# Patient Record
Sex: FEMALE | Race: WHITE | Employment: FULL TIME | URBAN - METROPOLITAN AREA
[De-identification: names, ages, dates, MRNs, and addresses within clinical notes are randomized per-mention and may not be internally consistent; named-entity substitution may affect disease eponyms.]

---

## 2020-05-27 ENCOUNTER — HOSPITAL ENCOUNTER (OUTPATIENT)
Dept: ULTRASOUND IMAGING | Age: 35
Discharge: HOME | End: 2020-05-27
Attending: NURSE PRACTITIONER
Payer: COMMERCIAL

## 2020-05-27 DIAGNOSIS — R22.2 LOCALIZED SWELLING, MASS AND LUMP, TRUNK: ICD-10-CM

## 2020-05-27 PROCEDURE — 76604 US EXAM CHEST: CPT

## 2021-05-03 ENCOUNTER — HOSPITAL ENCOUNTER (OUTPATIENT)
Dept: GENERAL RADIOLOGY | Age: 36
Discharge: HOME | End: 2021-05-03
Attending: NURSE PRACTITIONER
Payer: COMMERCIAL

## 2021-05-03 ENCOUNTER — TRANSCRIBE ORDER (OUTPATIENT)
Dept: GENERAL RADIOLOGY | Age: 36
End: 2021-05-03

## 2021-05-03 DIAGNOSIS — R06.00 DYSPNEA, UNSPECIFIED: ICD-10-CM

## 2021-05-03 DIAGNOSIS — R06.00 DYSPNEA, UNSPECIFIED: Primary | ICD-10-CM

## 2021-05-03 PROCEDURE — 71046 X-RAY EXAM CHEST 2 VIEWS: CPT

## 2024-06-14 LAB
ABO, EXTERNAL RESULT: NORMAL
C. TRACHOMATIS, EXTERNAL RESULT: NEGATIVE
HEP B, EXTERNAL RESULT: NEGATIVE
HIV, EXTERNAL RESULT: NON REACTIVE
N. GONORRHOEAE, EXTERNAL RESULT: NEGATIVE
RUBELLA TITER, EXTERNAL RESULT: NORMAL
T. PALLIDUM (SYPHILIS) ANTIBODY, EXTERNAL RESULT: NON REACTIVE

## 2024-11-15 LAB — GBS, EXTERNAL RESULT: NEGATIVE

## 2024-12-08 ENCOUNTER — HOSPITAL ENCOUNTER (INPATIENT)
Facility: HOSPITAL | Age: 39
LOS: 3 days | Discharge: HOME | DRG: 998 | End: 2024-12-11
Attending: OBSTETRICS & GYNECOLOGY | Admitting: OBSTETRICS & GYNECOLOGY
Payer: COMMERCIAL

## 2024-12-08 PROBLEM — Z3A.37 37 WEEKS GESTATION OF PREGNANCY: Status: ACTIVE | Noted: 2024-12-08

## 2024-12-08 PROBLEM — O10.919 CHRONIC HYPERTENSION AFFECTING PREGNANCY: Status: ACTIVE | Noted: 2024-12-08

## 2024-12-08 LAB
ABO + RH BLD: NORMAL
ALBUMIN SERPL-MCNC: 2.5 G/DL (ref 3.5–5)
ALBUMIN/GLOB SERPL: 0.8 (ref 1.1–2.2)
ALP SERPL-CCNC: 122 U/L (ref 45–117)
ALT SERPL-CCNC: 16 U/L (ref 12–78)
ANION GAP SERPL CALC-SCNC: 6 MMOL/L (ref 2–12)
AST SERPL-CCNC: 17 U/L (ref 15–37)
BILIRUB SERPL-MCNC: 0.3 MG/DL (ref 0.2–1)
BLOOD GROUP ANTIBODIES SERPL: NORMAL
BUN SERPL-MCNC: 8 MG/DL (ref 6–20)
BUN/CREAT SERPL: 17 (ref 12–20)
CALCIUM SERPL-MCNC: 8.2 MG/DL (ref 8.5–10.1)
CHLORIDE SERPL-SCNC: 107 MMOL/L (ref 97–108)
CO2 SERPL-SCNC: 23 MMOL/L (ref 21–32)
CREAT SERPL-MCNC: 0.47 MG/DL (ref 0.55–1.02)
CREAT UR-MCNC: 277 MG/DL
ERYTHROCYTE [DISTWIDTH] IN BLOOD BY AUTOMATED COUNT: 13.2 % (ref 11.5–14.5)
GLOBULIN SER CALC-MCNC: 3.3 G/DL (ref 2–4)
GLUCOSE SERPL-MCNC: 88 MG/DL (ref 65–100)
HCT VFR BLD AUTO: 30 % (ref 35–47)
HGB BLD-MCNC: 10.2 G/DL (ref 11.5–16)
MCH RBC QN AUTO: 30.9 PG (ref 26–34)
MCHC RBC AUTO-ENTMCNC: 34 G/DL (ref 30–36.5)
MCV RBC AUTO: 90.9 FL (ref 80–99)
NRBC # BLD: 0 K/UL (ref 0–0.01)
NRBC BLD-RTO: 0 PER 100 WBC
PLATELET # BLD AUTO: 231 K/UL (ref 150–400)
PMV BLD AUTO: 10.3 FL (ref 8.9–12.9)
POTASSIUM SERPL-SCNC: 4.3 MMOL/L (ref 3.5–5.1)
PROT SERPL-MCNC: 5.8 G/DL (ref 6.4–8.2)
PROT UR-MCNC: 22 MG/DL (ref 0–11.9)
PROT/CREAT UR-RTO: 0.1
RBC # BLD AUTO: 3.3 M/UL (ref 3.8–5.2)
SODIUM SERPL-SCNC: 136 MMOL/L (ref 136–145)
SPECIMEN EXP DATE BLD: NORMAL
WBC # BLD AUTO: 9.3 K/UL (ref 3.6–11)

## 2024-12-08 PROCEDURE — 86592 SYPHILIS TEST NON-TREP QUAL: CPT

## 2024-12-08 PROCEDURE — 84156 ASSAY OF PROTEIN URINE: CPT

## 2024-12-08 PROCEDURE — 82570 ASSAY OF URINE CREATININE: CPT

## 2024-12-08 PROCEDURE — 85027 COMPLETE CBC AUTOMATED: CPT

## 2024-12-08 PROCEDURE — 86850 RBC ANTIBODY SCREEN: CPT

## 2024-12-08 PROCEDURE — 86901 BLOOD TYPING SEROLOGIC RH(D): CPT

## 2024-12-08 PROCEDURE — 80053 COMPREHEN METABOLIC PANEL: CPT

## 2024-12-08 PROCEDURE — 86900 BLOOD TYPING SEROLOGIC ABO: CPT

## 2024-12-08 PROCEDURE — 6370000000 HC RX 637 (ALT 250 FOR IP): Performed by: ADVANCED PRACTICE MIDWIFE

## 2024-12-08 PROCEDURE — 36415 COLL VENOUS BLD VENIPUNCTURE: CPT

## 2024-12-08 PROCEDURE — 1100000000 HC RM PRIVATE

## 2024-12-08 RX ORDER — LABETALOL 100 MG/1
100 TABLET, FILM COATED ORAL 2 TIMES DAILY
COMMUNITY

## 2024-12-08 RX ORDER — ONDANSETRON 4 MG/1
4 TABLET, ORALLY DISINTEGRATING ORAL EVERY 6 HOURS PRN
Status: DISCONTINUED | OUTPATIENT
Start: 2024-12-08 | End: 2024-12-09 | Stop reason: SDUPTHER

## 2024-12-08 RX ORDER — MISOPROSTOL 200 UG/1
400 TABLET ORAL PRN
Status: DISCONTINUED | OUTPATIENT
Start: 2024-12-08 | End: 2024-12-09 | Stop reason: SDUPTHER

## 2024-12-08 RX ORDER — DOCUSATE SODIUM 100 MG/1
100 CAPSULE, LIQUID FILLED ORAL 2 TIMES DAILY
Status: DISCONTINUED | OUTPATIENT
Start: 2024-12-08 | End: 2024-12-11 | Stop reason: HOSPADM

## 2024-12-08 RX ORDER — SODIUM CHLORIDE 0.9 % (FLUSH) 0.9 %
5-40 SYRINGE (ML) INJECTION PRN
Status: DISCONTINUED | OUTPATIENT
Start: 2024-12-08 | End: 2024-12-11 | Stop reason: HOSPADM

## 2024-12-08 RX ORDER — ONDANSETRON 2 MG/ML
4 INJECTION INTRAMUSCULAR; INTRAVENOUS EVERY 6 HOURS PRN
Status: DISCONTINUED | OUTPATIENT
Start: 2024-12-08 | End: 2024-12-11 | Stop reason: HOSPADM

## 2024-12-08 RX ORDER — SODIUM CHLORIDE, SODIUM LACTATE, POTASSIUM CHLORIDE, AND CALCIUM CHLORIDE .6; .31; .03; .02 G/100ML; G/100ML; G/100ML; G/100ML
500 INJECTION, SOLUTION INTRAVENOUS PRN
Status: DISCONTINUED | OUTPATIENT
Start: 2024-12-08 | End: 2024-12-11 | Stop reason: HOSPADM

## 2024-12-08 RX ORDER — SIMETHICONE 80 MG
80 TABLET,CHEWABLE ORAL EVERY 6 HOURS PRN
Status: DISCONTINUED | OUTPATIENT
Start: 2024-12-08 | End: 2024-12-11 | Stop reason: HOSPADM

## 2024-12-08 RX ORDER — NALBUPHINE HYDROCHLORIDE 10 MG/ML
10 INJECTION INTRAMUSCULAR; INTRAVENOUS; SUBCUTANEOUS
Status: DISCONTINUED | OUTPATIENT
Start: 2024-12-08 | End: 2024-12-09 | Stop reason: SDUPTHER

## 2024-12-08 RX ORDER — NALBUPHINE HYDROCHLORIDE 10 MG/ML
10 INJECTION INTRAMUSCULAR; INTRAVENOUS; SUBCUTANEOUS
Status: DISCONTINUED | OUTPATIENT
Start: 2024-12-08 | End: 2024-12-11 | Stop reason: HOSPADM

## 2024-12-08 RX ORDER — ONDANSETRON 4 MG/1
4 TABLET, ORALLY DISINTEGRATING ORAL EVERY 6 HOURS PRN
Status: DISCONTINUED | OUTPATIENT
Start: 2024-12-08 | End: 2024-12-11 | Stop reason: HOSPADM

## 2024-12-08 RX ORDER — SODIUM CHLORIDE 0.9 % (FLUSH) 0.9 %
5-40 SYRINGE (ML) INJECTION EVERY 12 HOURS SCHEDULED
Status: DISCONTINUED | OUTPATIENT
Start: 2024-12-08 | End: 2024-12-09 | Stop reason: SDUPTHER

## 2024-12-08 RX ORDER — SODIUM CHLORIDE, SODIUM LACTATE, POTASSIUM CHLORIDE, CALCIUM CHLORIDE 600; 310; 30; 20 MG/100ML; MG/100ML; MG/100ML; MG/100ML
INJECTION, SOLUTION INTRAVENOUS CONTINUOUS
Status: DISCONTINUED | OUTPATIENT
Start: 2024-12-08 | End: 2024-12-11 | Stop reason: HOSPADM

## 2024-12-08 RX ORDER — SEVOFLURANE 250 ML/250ML
1 LIQUID RESPIRATORY (INHALATION) CONTINUOUS PRN
Status: DISCONTINUED | OUTPATIENT
Start: 2024-12-08 | End: 2024-12-09 | Stop reason: SDUPTHER

## 2024-12-08 RX ORDER — FOLIC ACID 1 MG/1
1 TABLET ORAL DAILY
Status: ON HOLD | COMMUNITY
End: 2024-12-19 | Stop reason: HOSPADM

## 2024-12-08 RX ORDER — DOCUSATE SODIUM 100 MG/1
100 CAPSULE, LIQUID FILLED ORAL 2 TIMES DAILY
Status: DISCONTINUED | OUTPATIENT
Start: 2024-12-08 | End: 2024-12-09 | Stop reason: SDUPTHER

## 2024-12-08 RX ORDER — SODIUM CHLORIDE 9 MG/ML
INJECTION, SOLUTION INTRAVENOUS PRN
Status: DISCONTINUED | OUTPATIENT
Start: 2024-12-08 | End: 2024-12-11 | Stop reason: HOSPADM

## 2024-12-08 RX ORDER — ESCITALOPRAM OXALATE 5 MG/1
15 TABLET ORAL
Status: ON HOLD | COMMUNITY
End: 2024-12-19

## 2024-12-08 RX ORDER — LABETALOL 100 MG/1
100 TABLET, FILM COATED ORAL EVERY 12 HOURS SCHEDULED
Status: DISCONTINUED | OUTPATIENT
Start: 2024-12-08 | End: 2024-12-11 | Stop reason: HOSPADM

## 2024-12-08 RX ORDER — SODIUM CHLORIDE, SODIUM LACTATE, POTASSIUM CHLORIDE, AND CALCIUM CHLORIDE .6; .31; .03; .02 G/100ML; G/100ML; G/100ML; G/100ML
500 INJECTION, SOLUTION INTRAVENOUS PRN
Status: DISCONTINUED | OUTPATIENT
Start: 2024-12-08 | End: 2024-12-09 | Stop reason: SDUPTHER

## 2024-12-08 RX ORDER — SODIUM CHLORIDE, SODIUM LACTATE, POTASSIUM CHLORIDE, CALCIUM CHLORIDE 600; 310; 30; 20 MG/100ML; MG/100ML; MG/100ML; MG/100ML
INJECTION, SOLUTION INTRAVENOUS CONTINUOUS
Status: DISCONTINUED | OUTPATIENT
Start: 2024-12-08 | End: 2024-12-09 | Stop reason: SDUPTHER

## 2024-12-08 RX ORDER — ACETAMINOPHEN 325 MG/1
650 TABLET ORAL EVERY 4 HOURS PRN
Status: DISCONTINUED | OUTPATIENT
Start: 2024-12-08 | End: 2024-12-11 | Stop reason: HOSPADM

## 2024-12-08 RX ORDER — SEVOFLURANE 250 ML/250ML
1 LIQUID RESPIRATORY (INHALATION) CONTINUOUS PRN
Status: DISCONTINUED | OUTPATIENT
Start: 2024-12-08 | End: 2024-12-11 | Stop reason: HOSPADM

## 2024-12-08 RX ORDER — NIFEDIPINE 20 MG/1
60 CAPSULE ORAL ONCE
COMMUNITY

## 2024-12-08 RX ORDER — SODIUM CHLORIDE 9 MG/ML
25 INJECTION, SOLUTION INTRAVENOUS PRN
Status: DISCONTINUED | OUTPATIENT
Start: 2024-12-08 | End: 2024-12-09 | Stop reason: SDUPTHER

## 2024-12-08 RX ORDER — TERBUTALINE SULFATE 1 MG/ML
0.25 INJECTION, SOLUTION SUBCUTANEOUS
Status: ACTIVE | OUTPATIENT
Start: 2024-12-08 | End: 2024-12-09

## 2024-12-08 RX ORDER — CARBOPROST TROMETHAMINE 250 UG/ML
250 INJECTION, SOLUTION INTRAMUSCULAR PRN
Status: DISCONTINUED | OUTPATIENT
Start: 2024-12-08 | End: 2024-12-11 | Stop reason: HOSPADM

## 2024-12-08 RX ORDER — ONDANSETRON 2 MG/ML
4 INJECTION INTRAMUSCULAR; INTRAVENOUS EVERY 6 HOURS PRN
Status: DISCONTINUED | OUTPATIENT
Start: 2024-12-08 | End: 2024-12-09 | Stop reason: SDUPTHER

## 2024-12-08 RX ORDER — DIPHENHYDRAMINE HCL 25 MG
25 CAPSULE ORAL NIGHTLY PRN
Status: DISCONTINUED | OUTPATIENT
Start: 2024-12-08 | End: 2024-12-11 | Stop reason: HOSPADM

## 2024-12-08 RX ORDER — NIFEDIPINE 30 MG/1
60 TABLET, EXTENDED RELEASE ORAL NIGHTLY
Status: DISCONTINUED | OUTPATIENT
Start: 2024-12-08 | End: 2024-12-11 | Stop reason: HOSPADM

## 2024-12-08 RX ORDER — SODIUM CHLORIDE, SODIUM LACTATE, POTASSIUM CHLORIDE, AND CALCIUM CHLORIDE .6; .31; .03; .02 G/100ML; G/100ML; G/100ML; G/100ML
1000 INJECTION, SOLUTION INTRAVENOUS PRN
Status: DISCONTINUED | OUTPATIENT
Start: 2024-12-08 | End: 2024-12-11 | Stop reason: HOSPADM

## 2024-12-08 RX ORDER — SODIUM CHLORIDE 0.9 % (FLUSH) 0.9 %
5-40 SYRINGE (ML) INJECTION EVERY 12 HOURS SCHEDULED
Status: DISCONTINUED | OUTPATIENT
Start: 2024-12-08 | End: 2024-12-11 | Stop reason: HOSPADM

## 2024-12-08 RX ORDER — METHYLERGONOVINE MALEATE 0.2 MG/ML
200 INJECTION INTRAVENOUS PRN
Status: DISCONTINUED | OUTPATIENT
Start: 2024-12-08 | End: 2024-12-11 | Stop reason: HOSPADM

## 2024-12-08 RX ORDER — MISOPROSTOL 200 UG/1
400 TABLET ORAL PRN
Status: DISCONTINUED | OUTPATIENT
Start: 2024-12-08 | End: 2024-12-11 | Stop reason: HOSPADM

## 2024-12-08 RX ORDER — CARBOPROST TROMETHAMINE 250 UG/ML
250 INJECTION, SOLUTION INTRAMUSCULAR PRN
Status: DISCONTINUED | OUTPATIENT
Start: 2024-12-08 | End: 2024-12-09 | Stop reason: SDUPTHER

## 2024-12-08 RX ORDER — TERBUTALINE SULFATE 1 MG/ML
0.25 INJECTION, SOLUTION SUBCUTANEOUS
Status: DISCONTINUED | OUTPATIENT
Start: 2024-12-08 | End: 2024-12-09 | Stop reason: SDUPTHER

## 2024-12-08 RX ORDER — ESCITALOPRAM OXALATE 10 MG/1
15 TABLET ORAL NIGHTLY
Status: DISCONTINUED | OUTPATIENT
Start: 2024-12-08 | End: 2024-12-11 | Stop reason: HOSPADM

## 2024-12-08 RX ORDER — SODIUM CHLORIDE 0.9 % (FLUSH) 0.9 %
5-40 SYRINGE (ML) INJECTION PRN
Status: DISCONTINUED | OUTPATIENT
Start: 2024-12-08 | End: 2024-12-09 | Stop reason: SDUPTHER

## 2024-12-08 RX ADMIN — Medication 25 MCG: at 22:26

## 2024-12-08 RX ADMIN — ESCITALOPRAM OXALATE 15 MG: 10 TABLET ORAL at 21:06

## 2024-12-08 RX ADMIN — DIPHENHYDRAMINE HYDROCHLORIDE 25 MG: 25 CAPSULE ORAL at 22:37

## 2024-12-08 RX ADMIN — NIFEDIPINE 60 MG: 30 TABLET, FILM COATED, EXTENDED RELEASE ORAL at 21:06

## 2024-12-08 RX ADMIN — LABETALOL HYDROCHLORIDE 100 MG: 100 TABLET, FILM COATED ORAL at 21:06

## 2024-12-08 NOTE — PROGRESS NOTES
Labor Progress Note    S: Patient seen, fetal heart rate and contraction pattern evaluated. Here for scheduled IOL per Dr. Ivey. Good FM. Denies VB/LOF/Contractions.      Physical Exam:  Patient Vitals for the past 4 hrs:   Temp Pulse Resp BP   24 1611 98 °F (36.7 °C) (!) 110 16 120/83         Cervical Exam: closed/50/-3, vtx confirmed by Vscan  Membranes:  Intact  Uterine Contractions:  Frequency: q3-4 mins, mild  Fetal Heart Rate: 125s, accels present, decels absent, moderate variability      Assessment/Plan:    39 y.o.  at 37w1d IUP  IOL CHTN - labetalol and procardia  Cat 1 tracing  GBS negative  AMA  Anxiety- Lexapro  IVF    P:  Admit for IOL per Dr. Ivey  Reviewed and sign consents  CBC, CMP, p/cr  Reviewed IOL process with patient/partner including cook/cytotec/pitocin r/b/a, lengthy IOL process and increased c/s risk- consents to proceed with cook balloon placement and IOL  Cook placed 60/60- will remove in 12h  Cytotec 25mcg bucally q4h x2, hold for 3 or more contractions in 10 minutes  Pit @0400  IV pain meds/epidural prn  Continuous monitoring  All questions asked/answered and patient/partner agree with plan  EDMUNDO Forrest - ROSAURA

## 2024-12-08 NOTE — PROGRESS NOTES
1607. Pt arrived from home for induction for CHTN and IVF pregnancy. Pt admitted to L&D room 3 with .     1635. IV started in L wrist, pt tolerated well.     1645. Blood work sent to lab. MATHEW Tolbert cnm notified pt is here and in room. Supplies at bedside for cervical ripening balloon.     [id number]. Pt sitting up, fetal tracing picking up maternal (). Monitor adjusted.     [id number]. MATHEW Tolbert cnm at bedside to assess pt and discuss plan of care. Bedside ultrasound performed to confirm head down. All questions answered.    1824. Cervical ripening balloon placed with ease. 60ml uterine, 60ml vaginal. Pt tolerated well.     1930. Bedside and Verbal shift change report given to lanre Robbins rn (oncoming nurse) by jay Barfield rn (offgoing nurse). Report included the following information Nurse Handoff Report, MAR, and Recent Results.

## 2024-12-08 NOTE — H&P
History & Physical    Name: Sudha Barriga MRN: [id number]  SSN: [id number]    YOB: 1985  Age: 39 y.o.  Sex: female      Subjective: Induction     Estimated Date of Delivery: 24  OB History          1    Para        Term                AB        Living             SAB        IAB        Ectopic        Molar        Multiple        Live Births                    Ms. Barriga is 39 y.o.  at 37w1d admitted for induction of labor due to CHTN on antihypertensives.     Patient and her  Chemo are expecting a baby boy \"Chemo Douglas\".     Prenatal course pertinent for   CHTN - on labetalol 100mg BID and Nifedipine. Baseline Ur pr/cr 0.18  AMA - daily 81mg ASA  IVF pregnancy - normal NIPT  Elevated 1hr glucola > normal 3hr GTT  Anxiety - on lexapro     Received RSV and Tdap on     Please see prenatal records for details.    Past Medical History:   Diagnosis Date    Hypertension     Infertility, female      Past Surgical History:   Procedure Laterality Date    HYSTEROSCOPY      WISDOM TOOTH EXTRACTION       Social History     Occupational History    Not on file   Tobacco Use    Smoking status: Never    Smokeless tobacco: Never   Substance and Sexual Activity    Alcohol use: Not Currently    Drug use: Never    Sexual activity: Yes     Partners: Male     No family history on file.    No Known Allergies  Prior to Admission medications    Medication Sig Start Date End Date Taking? Authorizing Provider   labetalol (NORMODYNE) 100 MG tablet Take 1 tablet by mouth 2 times daily   Yes Manohar Jaimes MD   NIFEdipine (PROCARDIA) 20 MG capsule Take 3 capsules by mouth once   Yes Manohar Jaimes MD   Prenatal Vit w/Ha-Fqjynsdqe-MK (PNV PO) Take 1 tablet by mouth daily   Yes Manohar Jaimes MD   folic acid (FOLVITE) 1 MG tablet Take 1 tablet by mouth daily   Yes Manohar Jaimes MD   Ferrous Sulfate (SLOW FE PO) Take 1 tablet by mouth daily   Yes Manohar Jaimes MD

## 2024-12-09 LAB — RPR SER QL: NONREACTIVE

## 2024-12-09 PROCEDURE — 6370000000 HC RX 637 (ALT 250 FOR IP): Performed by: ADVANCED PRACTICE MIDWIFE

## 2024-12-09 PROCEDURE — 1100000000 HC RM PRIVATE

## 2024-12-09 PROCEDURE — 6370000000 HC RX 637 (ALT 250 FOR IP): Performed by: OBSTETRICS & GYNECOLOGY

## 2024-12-09 PROCEDURE — 6360000002 HC RX W HCPCS: Performed by: ADVANCED PRACTICE MIDWIFE

## 2024-12-09 PROCEDURE — 2580000003 HC RX 258: Performed by: ADVANCED PRACTICE MIDWIFE

## 2024-12-09 RX ORDER — ACETAMINOPHEN 500 MG
1000 TABLET ORAL ONCE
Status: COMPLETED | OUTPATIENT
Start: 2024-12-09 | End: 2024-12-09

## 2024-12-09 RX ORDER — CALCIUM CARBONATE 500 MG/1
500 TABLET, CHEWABLE ORAL 3 TIMES DAILY PRN
Status: DISCONTINUED | OUTPATIENT
Start: 2024-12-09 | End: 2024-12-11 | Stop reason: HOSPADM

## 2024-12-09 RX ADMIN — NIFEDIPINE 60 MG: 30 TABLET, FILM COATED, EXTENDED RELEASE ORAL at 21:03

## 2024-12-09 RX ADMIN — OXYTOCIN 1 MILLI-UNITS/MIN: 10 INJECTION, SOLUTION INTRAMUSCULAR; INTRAVENOUS at 06:29

## 2024-12-09 RX ADMIN — SODIUM CHLORIDE: 9 INJECTION, SOLUTION INTRAVENOUS at 06:29

## 2024-12-09 RX ADMIN — ESCITALOPRAM OXALATE 15 MG: 10 TABLET ORAL at 21:01

## 2024-12-09 RX ADMIN — CALCIUM CARBONATE (ANTACID) CHEW TAB 500 MG 500 MG: 500 CHEW TAB at 20:13

## 2024-12-09 RX ADMIN — Medication 25 MCG: at 18:14

## 2024-12-09 RX ADMIN — LABETALOL HYDROCHLORIDE 100 MG: 100 TABLET, FILM COATED ORAL at 09:02

## 2024-12-09 RX ADMIN — DIPHENHYDRAMINE HYDROCHLORIDE 25 MG: 25 CAPSULE ORAL at 22:25

## 2024-12-09 RX ADMIN — Medication 25 MCG: at 13:58

## 2024-12-09 RX ADMIN — ACETAMINOPHEN 1000 MG: 500 TABLET ORAL at 11:08

## 2024-12-09 RX ADMIN — Medication 25 MCG: at 22:28

## 2024-12-09 RX ADMIN — Medication 25 MCG: at 02:39

## 2024-12-09 NOTE — PROGRESS NOTES
Labor Progress Note  Patient seen, fetal heart rate and contraction pattern evaluated, patient examined.    Patient feeling comfortable with only mild back cramping sensation.     Denies HA, visual changes, SOB, or mid epigastric pain. Denies LOF or VB.     Physical Exam:  Cervical Exam: 1/thi/high, -4, unchanged  Fetal Heart Rate: cat 1  [place]: q 2-4, not appreciating  Pit 18 mu    Assessment/Plan:  Ms.Ivy Barriga is a 39 y.o.  at 37w2d for IOL 2/2 CHTN on meds  - BP reviewed and wnl. Cont home meds  - s/p cervical balloon that had to be deflated and 2 doses of PO cytotec  - Pt not appreciating contractions on 18 of pit and no cervical change. Plan to stop pitocin and administer 25mcg of vaginal cytotec. Likely repeat cervical balloon after   - Patient may have nitrous oxide, IV pain medication, or epidural as desired    Sarita Ivey, DO

## 2024-12-09 NOTE — PROGRESS NOTES
2315: SBAR report received from ANGEL Robbins RN. Assuming care of patient at this time   0105: RN at bedside adjusting US.   0111: Patient up to the bathroom   0212: RN at bedside adjusting US.   0329: Patient resting comfortably. Cytotec given. Will start pitocin 4 hours after.  0625: In to start pitocin. Patient sleeping. Offered to check balloon. Tension applied and balloon still in place.   0629: Pitocin started. See MAR.  0700: CRB deflated per ROSAURA Tolbert. Balloon out.  0745: SBAR report given to MARLEN Ely RN who is assuming care of patient at this time

## 2024-12-09 NOTE — PROGRESS NOTES
1930 BSR received from VANESSA Barfield RN. Pt is accompanied by her . Plan of care reviewed. VSSA.     2226 First dose of cytotec given. [place] balloon still in place.     2300 BSR EDWIN Walker RN. Plan of care, medications and hx reviewed with oncoming nurse.

## 2024-12-09 NOTE — PROGRESS NOTES
Labor Progress Note  Patient seen, fetal heart rate and contraction pattern evaluated, patient examined.    Physical Exam:  Cervical Exam: / -4  Fetal Heart Rate: cat 1  [place]: q 3-4 not appreciating  60/60 cook placed    Assessment/Plan:  Ms.Ivy Barriga is a 39 y.o.  at 37w2d for IOL 2/2 CHTN on meds  - BP reviewed and wnl. Cont home meds  - s/p cervical balloon, PO cytotec x2, maxed out on pit, vaginal cytotec  - Placed 2nd cervical balloon at 6pm. Cervix notably softer and anticipate spontaneous expulsion of cervical balloon   - Plan for 2 more doses of cytotec then will begin pitocin  - Patient may have nitrous oxide, IV pain medication, or epidural as desired    Patient signed out to OB hospitalist for management overnight.  Sarita Ivey DO

## 2024-12-09 NOTE — PLAN OF CARE
Problem: Vaginal Birth or  Section  Goal: Fetal and maternal status remain reassuring during the birth process  Description:  Birth OB-Pregnancy care plan goal which identifies if the fetal and maternal status remain reassuring during the birth process  2024 by Elif Robbins RN  Outcome: Progressing  Flowsheets (Taken 2024)  Fetal and Maternal Status Remain Reassuring During the Birth Process:   Monitor vital signs   Monitor labor progression (Vaginal delivery)   Monitor fetal heart rate   Monitor uterine activity  2024 by Elif Robbins RN  Outcome: Progressing  Flowsheets (Taken 2024)  Fetal and Maternal Status Remain Reassuring During the Birth Process:   Monitor vital signs   Monitor labor progression (Vaginal delivery)   Monitor fetal heart rate   Monitor uterine activity     Problem: Pain  Goal: Verbalizes/displays adequate comfort level or baseline comfort level  2024 by Elif Robbins RN  Flowsheets (Taken 2024)  Verbalizes/displays adequate comfort level or baseline comfort level:   Encourage patient to monitor pain and request assistance   Administer analgesics based on type and severity of pain and evaluate response   Consider cultural and social influences on pain and pain management   Notify Licensed Independent Practitioner if interventions unsuccessful or patient reports new pain   Implement non-pharmacological measures as appropriate and evaluate response   Assess pain using appropriate pain scale  2024 by Elif Robbins RN  Outcome: Progressing  Flowsheets (Taken 2024)  Verbalizes/displays adequate comfort level or baseline comfort level:   Encourage patient to monitor pain and request assistance   Administer analgesics based on type and severity of pain and evaluate response   Consider cultural and social influences on pain and pain management   Notify Licensed Independent Practitioner if  interventions unsuccessful or patient reports new pain   Implement non-pharmacological measures as appropriate and evaluate response   Assess pain using appropriate pain scale     Problem: Infection - Adult  Goal: Absence of infection during hospitalization  12/8/2024 2013 by Elif Robbins RN  Flowsheets (Taken 12/8/2024 2013)  Absence of infection during hospitalization:   Assess and monitor for signs and symptoms of infection   Monitor all insertion sites i.e., indwelling lines, tubes and drains   Monitor lab/diagnostic results   Administer medications as ordered  12/8/2024 2013 by Elif Robbins RN  Outcome: Progressing  Flowsheets (Taken 12/8/2024 2013)  Absence of infection during hospitalization:   Assess and monitor for signs and symptoms of infection   Monitor all insertion sites i.e., indwelling lines, tubes and drains   Monitor lab/diagnostic results   Administer medications as ordered     Problem: Safety - Adult  Goal: Free from fall injury  12/8/2024 2013 by Elif Robbins RN  Flowsheets (Taken 12/8/2024 2013)  Free From Fall Injury:   Instruct family/caregiver on patient safety   Based on caregiver fall risk screen, instruct family/caregiver to ask for assistance with transferring infant if caregiver noted to have fall risk factors  12/8/2024 2013 by Elif Robbins RN  Outcome: Progressing  Flowsheets (Taken 12/8/2024 2013)  Free From Fall Injury:   Instruct family/caregiver on patient safety   Based on caregiver fall risk screen, instruct family/caregiver to ask for assistance with transferring infant if caregiver noted to have fall risk factors     Problem: Chronic Conditions and Co-morbidities  Goal: Patient's chronic conditions and co-morbidity symptoms are monitored and maintained or improved  Outcome: Progressing

## 2024-12-09 NOTE — PROGRESS NOTES
Labor Progress Note  Patient seen, fetal heart rate and contraction pattern evaluated, patient examined.    Patient feeling comfortable. Had cervical balloon overnight that had to be deflated. Currently on pitocin and not appreciating much.     Denies HA, visual changes, SOB, or mid epigastric pain. Denies LOF or VB.     Physical Exam:  Cervical Exam: 1/thi/high, -4  BSUS confirmed cephalic  Fetal Heart Rate: cat 1  [place]: q 2-4, not appreciating  Pit 4 mu    Assessment/Plan:  Ms.Ivy Barriga is a 39 y.o.  at 37w2d for IOL 2/2 CHTN on meds  - BP reviewed and wnl. Cont home meds  - s/p cervical balloon that had to be deflated. 2 doses of PO cytotec  - Will continue pitocin titration. If no cervical change with max pit will stop and plan for vaginal cytotec and then retrial of cervical balloon   - Patient may have nitrous oxide, IV pain medication, or epidural as desired    Sarita Ivey, DO

## 2024-12-09 NOTE — PROGRESS NOTES
0730 Bedside shift change report given to Shireen RN (oncoming nurse) by MARLA Walker RN (offgoing nurse). Report included the following information Nurse Handoff Report, Intake/Output, MAR, and Recent Results.     1108 pt has a mild headache. BP's remain stable and non severe. Tylenol given.

## 2024-12-10 PROCEDURE — 2580000003 HC RX 258: Performed by: ADVANCED PRACTICE MIDWIFE

## 2024-12-10 PROCEDURE — 6370000000 HC RX 637 (ALT 250 FOR IP): Performed by: ADVANCED PRACTICE MIDWIFE

## 2024-12-10 PROCEDURE — 1100000000 HC RM PRIVATE

## 2024-12-10 PROCEDURE — 99284 EMERGENCY DEPT VISIT MOD MDM: CPT

## 2024-12-10 PROCEDURE — 6370000000 HC RX 637 (ALT 250 FOR IP)

## 2024-12-10 PROCEDURE — 6360000002 HC RX W HCPCS: Performed by: ADVANCED PRACTICE MIDWIFE

## 2024-12-10 RX ADMIN — ESCITALOPRAM OXALATE 15 MG: 10 TABLET ORAL at 22:09

## 2024-12-10 RX ADMIN — SODIUM CHLORIDE: 9 INJECTION, SOLUTION INTRAVENOUS at 03:16

## 2024-12-10 RX ADMIN — DOCUSATE SODIUM 100 MG: 100 CAPSULE, LIQUID FILLED ORAL at 09:56

## 2024-12-10 RX ADMIN — Medication 50 MCG: at 23:31

## 2024-12-10 RX ADMIN — LABETALOL HYDROCHLORIDE 100 MG: 100 TABLET, FILM COATED ORAL at 22:07

## 2024-12-10 RX ADMIN — NIFEDIPINE 60 MG: 30 TABLET, FILM COATED, EXTENDED RELEASE ORAL at 22:07

## 2024-12-10 RX ADMIN — SODIUM CHLORIDE 75 ML/HR: 9 INJECTION, SOLUTION INTRAVENOUS at 13:54

## 2024-12-10 RX ADMIN — LABETALOL HYDROCHLORIDE 100 MG: 100 TABLET, FILM COATED ORAL at 09:56

## 2024-12-10 RX ADMIN — OXYTOCIN 1 MILLI-UNITS/MIN: 10 INJECTION, SOLUTION INTRAMUSCULAR; INTRAVENOUS at 03:22

## 2024-12-10 NOTE — PROGRESS NOTES
MD Michael at . SVE mostly unchanged. Plan to continue pitocin infusion for now, reassess later tonight and continue returning to cytotec. All of pt's questions answered; pt verbalizes understanding and has no further questions.      GOALS: Pt will perform transfers with cane & supervision in 4wks

## 2024-12-10 NOTE — PROGRESS NOTES
Labor Progress Note  Patient seen, fetal heart rate and contraction pattern evaluated, patient examined.  Feels crampy, 10mu pit  Patient Vitals for the past 2 hrs:   BP Temp Temp src Pulse Weight   12/10/24 0740 -- -- -- -- 88.9 kg (196 lb)   12/10/24 0706 130/84 -- -- 87 --   12/10/24 0601 115/70 97.9 °F (36.6 °C) Oral 70 --       Physical Exam:  Cervical Exam:  balloon deflated and removed. Cvx extremely high and therefore suboptimal exam- 3/50/-4. Unable to palpate presenting part at all. Vscan confirmed vtx  Uterine Activity: irreg irritability  Fetal Heart Rate: Variability: moderate  Accelerations: yes  Decelerations: none    Assessment/Plan:  IOL for HTN, now s/p Cook x2 with miso and pit all day yesterday.    Reassuring fetal status, Continue plan for vaginal delivery, titrate pit.    Continue labetalol and Nifedipine.    Shawnee Michael MD

## 2024-12-10 NOTE — PROGRESS NOTES
Labor Progress Note  Patient seen, fetal heart rate and contraction pattern evaluated, patient examined.  Still feels the same, crampy, no change  No data found.    Physical Exam:  Cervical Exam:  unchanged  Uterine Activity: irreg, irrit  Fetal Heart Rate: Variability: moderate  Accelerations: yes  Decelerations: none    Assessment/Plan:  Reassuring fetal status, Continue to titrate pit (currently at 22mu).  Has had Cook/miso then pit all day yesterday followed by 2nd Cook/miso last night.  Will max out pit to 25.  Likely plan to switch to miso overnight tonight.  Pt agreeable to whatever plan helps accomplish . Continue labetalol and Nifedipine.    Shawnee Michael MD

## 2024-12-10 NOTE — PROGRESS NOTES
Labor Progress Note  Patient seen, fetal heart rate and contraction pattern evaluated, patient examined.  Still feels about the same, crampy but not in pain. 18mu pit  No data found.    Physical Exam:  Cervical Exam:  no change, cvx still high enough that it is difficult to fully palpate it.  Unable to palpate presenting part at all.  Uterine Activity: q3-4 mins  Fetal Heart Rate: Variability: moderate  Accelerations: yes  Decelerations: none    Assessment/Plan:  Reassuring fetal status, continue to titrate pit.  Discussed the hope that we could perform AROM at some point, however would not do at this point with fetus still so high.  Continue pit    Shawnee Michael MD

## 2024-12-10 NOTE — PROGRESS NOTES
1938 - Bedside and Verbal shift change report given to ANSELMO Lozano RNC (oncoming nurse) by KAREEM Ely RN (offgoing nurse). Report included the following information Nurse Handoff Report, Intake/Output, MAR, Recent Results, and Med Rec Status.   Pt requesting \"Tums\" for heartburn. She states she feels \"menstrual-like cramping.\"    2018 - Dr Chavarria states pt may have a 1 hour break from fetal monitoring, per pt's request to ambulate and shower.    2023 - Monitors removed; pt plans to get in the shower.  (Chemo) present.    0320 - Pitocin started via IV.    0549 - Pt unplugged monitors to get up to the bathroom.  0552 - RN at bedside to assist pt.  0556 - Pt back in bed and monitors plugged in. Pt reports \"I still can't tell I'm having contractions, just cramping.\" She states it is no stronger than before the Pitocin was started.    0728 - Dr Michael at bedside to assess pt. Cervical ripening balloon removed (by deflation). SVE 1/50/-4    8540 - Bedside and Verbal shift change report given to MARLON Rojas RN (oncoming nurse) by ANSELMO Lozano RNC (offgoing nurse). Report included the following information Nurse Handoff Report, Intake/Output, MAR, Recent Results, and Med Rec Status.

## 2024-12-10 NOTE — PROGRESS NOTES
In to meet patient and review plan of care.    39 y.o.  at 37w2d for IOL 2/2 CHTN on meds     She's sitting on birthing ball. Feels some cramps. Recently had a shower. Feeling pretty good mentally.    Cat 1 tracing    Cook (second one placed)/cytotec x 2 doses and pit in the morning   Complex Repair And Dermal Autograft Text: The defect edges were debeveled with a #15 scalpel blade.  The primary defect was closed partially with a complex linear closure.  Given the location of the defect, shape of the defect and the proximity to free margins an dermal autograft was deemed most appropriate to repair the remaining defect.  The graft was trimmed to fit the size of the remaining defect.  The graft was then placed in the primary defect, oriented appropriately, and sutured into place.

## 2024-12-10 NOTE — PROGRESS NOTES
7674 Bedside report received from JORDAN Lozano RN. Dr Michael at bedside, SVE 3/50/-4, ultrasound performed to confirm vertex presentation.   1230 Dr Michael at bedside, SVE no change from previous exam.Plan to titrate Pitocin to achieve adequate labor.   7702 Brief report given to ANDRIA Ramirez RN.

## 2024-12-11 VITALS
RESPIRATION RATE: 16 BRPM | WEIGHT: 196 LBS | BODY MASS INDEX: 32.65 KG/M2 | DIASTOLIC BLOOD PRESSURE: 84 MMHG | HEART RATE: 75 BPM | OXYGEN SATURATION: 99 % | HEIGHT: 65 IN | TEMPERATURE: 97.9 F | SYSTOLIC BLOOD PRESSURE: 125 MMHG

## 2024-12-11 LAB
ALBUMIN SERPL-MCNC: 2.5 G/DL (ref 3.5–5)
ALBUMIN/GLOB SERPL: 0.7 (ref 1.1–2.2)
ALP SERPL-CCNC: 130 U/L (ref 45–117)
ALT SERPL-CCNC: 13 U/L (ref 12–78)
ANION GAP SERPL CALC-SCNC: 7 MMOL/L (ref 2–12)
AST SERPL-CCNC: 17 U/L (ref 15–37)
BILIRUB SERPL-MCNC: 0.3 MG/DL (ref 0.2–1)
BUN SERPL-MCNC: 6 MG/DL (ref 6–20)
BUN/CREAT SERPL: 13 (ref 12–20)
CALCIUM SERPL-MCNC: 8.4 MG/DL (ref 8.5–10.1)
CHLORIDE SERPL-SCNC: 108 MMOL/L (ref 97–108)
CO2 SERPL-SCNC: 22 MMOL/L (ref 21–32)
CREAT SERPL-MCNC: 0.47 MG/DL (ref 0.55–1.02)
ERYTHROCYTE [DISTWIDTH] IN BLOOD BY AUTOMATED COUNT: 13.2 % (ref 11.5–14.5)
GLOBULIN SER CALC-MCNC: 3.4 G/DL (ref 2–4)
GLUCOSE SERPL-MCNC: 98 MG/DL (ref 65–100)
HCT VFR BLD AUTO: 31.7 % (ref 35–47)
HGB BLD-MCNC: 10.7 G/DL (ref 11.5–16)
MCH RBC QN AUTO: 30.9 PG (ref 26–34)
MCHC RBC AUTO-ENTMCNC: 33.8 G/DL (ref 30–36.5)
MCV RBC AUTO: 91.6 FL (ref 80–99)
NRBC # BLD: 0 K/UL (ref 0–0.01)
NRBC BLD-RTO: 0 PER 100 WBC
PLATELET # BLD AUTO: 243 K/UL (ref 150–400)
PMV BLD AUTO: 10.5 FL (ref 8.9–12.9)
POTASSIUM SERPL-SCNC: 3.8 MMOL/L (ref 3.5–5.1)
PROT SERPL-MCNC: 5.9 G/DL (ref 6.4–8.2)
RBC # BLD AUTO: 3.46 M/UL (ref 3.8–5.2)
SODIUM SERPL-SCNC: 137 MMOL/L (ref 136–145)
WBC # BLD AUTO: 10.3 K/UL (ref 3.6–11)

## 2024-12-11 PROCEDURE — 6370000000 HC RX 637 (ALT 250 FOR IP): Performed by: OBSTETRICS & GYNECOLOGY

## 2024-12-11 PROCEDURE — 85027 COMPLETE CBC AUTOMATED: CPT

## 2024-12-11 PROCEDURE — 36415 COLL VENOUS BLD VENIPUNCTURE: CPT

## 2024-12-11 PROCEDURE — 6370000000 HC RX 637 (ALT 250 FOR IP): Performed by: ADVANCED PRACTICE MIDWIFE

## 2024-12-11 PROCEDURE — 80053 COMPREHEN METABOLIC PANEL: CPT

## 2024-12-11 RX ADMIN — Medication 50 MCG: at 03:35

## 2024-12-11 RX ADMIN — Medication 50 MCG: at 07:41

## 2024-12-11 RX ADMIN — LABETALOL HYDROCHLORIDE 100 MG: 100 TABLET, FILM COATED ORAL at 09:53

## 2024-12-11 RX ADMIN — DOCUSATE SODIUM 100 MG: 100 CAPSULE, LIQUID FILLED ORAL at 09:53

## 2024-12-11 NOTE — PROGRESS NOTES
0745 Report received from HUMBERTO Rodriguez RN. Pt in bed resting comfortably. 50mcg cytotec ordered and given buccal. Plan to rest a little longer before getting up for the day. VSS.    0945 Labetalol and colace given per orders. EFM DC per protocol 2h post cytotec. Plan to spin patient in 30 min,    1015 RN at bedside beginning spinning babies circuit. L and R SLR, knee chest with \"shake the apples\", captain laverne L and R.    1150 Pt placed back on EFM. Awaiting MD for re-eval and POC discussion.    1230 Dr Michael at bedside. Cervix unchanged. High and soft. Plan for pt to be DC home, follow up with Dr Ivey to be scheduled for Friday or Monday.

## 2024-12-11 NOTE — PROGRESS NOTES
1930 - Bedside and Verbal shift change report given to HOWARD Encarnacion (oncoming nurse) by HOWARD Hwang (offgoing nurse). Report included the following information Nurse Handoff Report and Intake/Output.    2015 - Orders received to d/c pitocin, allow patient to shower and take a break from the monitors and then start cytotec 50mcg  0730 - Dr. Michael at bedside to assess, discussing plan of care, orders received to give one more dose of cytotec  0730 - Bedside and Verbal shift change report given to HOWARD Hwang (oncoming nurse) by HOWARD Encarnacion (offgoing nurse). Report included the following information Nurse Handoff Report, MAR, and Recent Results.

## 2024-12-11 NOTE — PROGRESS NOTES
Labor Progress Note  Patient seen, fetal heart rate and contraction pattern evaluated, patient examined.  No change  Patient Vitals for the past 2 hrs:   BP Pulse Resp SpO2   12/11/24 1150 125/84 75 16 99 %       Physical Exam:  Cervical Exam:  unchanged  Uterine Activity: irreg, infrequent  Fetal Heart Rate: Baseline: 130 per minute  Variability: moderate  Accelerations: yes  Decelerations: none    Assessment/Plan:  Earlier this AM I discussed the possibility of d/c home w/ Dr. Ivey.  Bps have been well controlled and labs were NL.  Fetal monitoring has been reassuring.  ACOG guidelines allow for IOL for CHTN up to 39w6d.  Dr. Ivey was amenable to the idea therefore offered this option to the patient.  Also offered the option to continue attempts at IOL.  She would like to go home.  Will plan F/U in offfice w/ [facility] on either Friday or Monday w/ Dr. Ivey.   Will recheck CBC and CMP here prior to d/c.  Instructions reviewed.      Shawnee Michael MD

## 2024-12-11 NOTE — DISCHARGE INSTRUCTIONS
High Blood Pressure in Pregnancy: Care Instructions  Overview     High blood pressure (hypertension) means that the force of blood against your artery walls is too strong.  High blood pressure problems during pregnancy include:  Chronic hypertension. This is high blood pressure that starts before pregnancy.  Gestational hypertension. This is high blood pressure that starts in the second or third trimester of pregnancy.  Preeclampsia. This is a problem that includes high blood pressure and signs of organ injury during pregnancy. In some cases, it leads to eclampsia. Eclampsia causes seizures.  High blood pressure during pregnancy can affect the amount of oxygen and nutrients your baby receives. This can affect how your baby grows. High blood pressure can also cause other serious problems for both you and your baby. For example, the placenta might separate too early from the wall of the uterus (placental abruption). This can cause serious bleeding or premature birth.  To prevent problems, you and your baby will be watched very closely. You will have to check your blood pressure often during and after the pregnancy.  If your blood pressure rises suddenly or is very high during pregnancy, your doctor may prescribe medicines. They can usually control blood pressure.  If your blood pressure affects your or your baby's health, you may need to be monitored in the hospital. You may get medicines. Or your doctor may need to deliver your baby early.  After your baby is born, your blood pressure will probably improve. But sometimes blood pressure problems continue after birth. If you had high blood pressure during pregnancy, you have more risk of having high blood pressure, heart disease, stroke, kidney disease, and diabetes later in life. Work with your doctor to make heart-healthy lifestyle choices. These include eating healthy foods, being active, staying at a healthy weight, and not smoking. Get the checkups you need.

## 2024-12-11 NOTE — PROGRESS NOTES
Labor Progress Note  Patient seen, fetal heart rate and contraction pattern evaluated, patient examined.  Has been sleeping, denies cramping right now.  Got 2 doses miso 50mcg overnight  No data found.    Physical Exam:  Cervical Exam:  deferred  Uterine Activity: None  Fetal Heart Rate: Baseline: 120 per minute  Variability: moderate  Accelerations: yes  Decelerations: none  Current tracing is somewhat broken, not tracing well due to sleeping but appears reassuring    Assessment/Plan:  IOL for CHTN.  Bps stable on labetalol and Procardia.   Fetal status reassuring.  Got Cook/miso overnight on Sun. 12/8  Pit throughout day on Mon. 12/9  2nd Cook/miso again overnight Mon. 12/9  Pit all day yesterday (Tues. 12/10)  Miso overnight last night  Will give a 3rd dose of miso 50mcg now and reassess at lunch time with cvx check.  Pt is agreeable to plan.    Shawnee Michael MD

## 2024-12-11 NOTE — PROGRESS NOTES
Labor Progress Note  39 y.o.  at 37w2d for IOL 2/2 CHTN on Labetalol and Procardia  Bps stable  Denies feeling any contractions  Strip was reviewed. Cat 1 tracing  Pitocin has been turned off  Plan to start with 50 mcg Buccal cytotec in an hour       Dann Almonte MD

## 2024-12-11 NOTE — PROGRESS NOTES
Spoke with pt via phone. She had additional questions. Answered questions. Reiterated to pt that she is not being forced to go home and we can proceed with IOL if she prefers. She stated that she prefers to go home and feels reassured and comfortable. Already has an appt Friday in our office.

## 2024-12-11 NOTE — PROGRESS NOTES
1410: Written discharge instructions verbally reviewed with patient. Pt verbalizes understanding. Pt discharged home at this time.

## 2024-12-13 ENCOUNTER — HOSPITAL ENCOUNTER (INPATIENT)
Facility: HOSPITAL | Age: 39
LOS: 6 days | Discharge: HOME | End: 2024-12-19
Attending: OBSTETRICS & GYNECOLOGY | Admitting: OBSTETRICS & GYNECOLOGY
Payer: COMMERCIAL

## 2024-12-13 PROBLEM — Z34.90 ENCOUNTER FOR INDUCTION OF LABOR: Status: ACTIVE | Noted: 2024-12-13

## 2024-12-13 LAB
ABO + RH BLD: NORMAL
ALBUMIN SERPL-MCNC: 2.5 G/DL (ref 3.5–5)
ALBUMIN/GLOB SERPL: 0.8 (ref 1.1–2.2)
ALP SERPL-CCNC: 136 U/L (ref 45–117)
ALT SERPL-CCNC: 14 U/L (ref 12–78)
ANION GAP SERPL CALC-SCNC: 7 MMOL/L (ref 2–12)
AST SERPL-CCNC: 10 U/L (ref 15–37)
BILIRUB SERPL-MCNC: 0.2 MG/DL (ref 0.2–1)
BLOOD GROUP ANTIBODIES SERPL: NORMAL
BUN SERPL-MCNC: 7 MG/DL (ref 6–20)
BUN/CREAT SERPL: 13 (ref 12–20)
CALCIUM SERPL-MCNC: 8.7 MG/DL (ref 8.5–10.1)
CHLORIDE SERPL-SCNC: 108 MMOL/L (ref 97–108)
CO2 SERPL-SCNC: 24 MMOL/L (ref 21–32)
CREAT SERPL-MCNC: 0.55 MG/DL (ref 0.55–1.02)
CREAT UR-MCNC: 256 MG/DL
ERYTHROCYTE [DISTWIDTH] IN BLOOD BY AUTOMATED COUNT: 13.2 % (ref 11.5–14.5)
GLOBULIN SER CALC-MCNC: 3.3 G/DL (ref 2–4)
GLUCOSE SERPL-MCNC: 91 MG/DL (ref 65–100)
HCT VFR BLD AUTO: 31.9 % (ref 35–47)
HGB BLD-MCNC: 10.6 G/DL (ref 11.5–16)
MCH RBC QN AUTO: 30.4 PG (ref 26–34)
MCHC RBC AUTO-ENTMCNC: 33.2 G/DL (ref 30–36.5)
MCV RBC AUTO: 91.4 FL (ref 80–99)
NRBC # BLD: 0 K/UL (ref 0–0.01)
NRBC BLD-RTO: 0 PER 100 WBC
PLATELET # BLD AUTO: 239 K/UL (ref 150–400)
PMV BLD AUTO: 10.4 FL (ref 8.9–12.9)
POTASSIUM SERPL-SCNC: 4.1 MMOL/L (ref 3.5–5.1)
PROT SERPL-MCNC: 5.8 G/DL (ref 6.4–8.2)
PROT UR-MCNC: 24 MG/DL (ref 0–11.9)
PROT/CREAT UR-RTO: 0.1
RBC # BLD AUTO: 3.49 M/UL (ref 3.8–5.2)
SODIUM SERPL-SCNC: 139 MMOL/L (ref 136–145)
SPECIMEN EXP DATE BLD: NORMAL
WBC # BLD AUTO: 7.7 K/UL (ref 3.6–11)

## 2024-12-13 PROCEDURE — 6370000000 HC RX 637 (ALT 250 FOR IP): Performed by: OBSTETRICS & GYNECOLOGY

## 2024-12-13 PROCEDURE — 80053 COMPREHEN METABOLIC PANEL: CPT

## 2024-12-13 PROCEDURE — 85027 COMPLETE CBC AUTOMATED: CPT

## 2024-12-13 PROCEDURE — 59200 INSERT CERVICAL DILATOR: CPT

## 2024-12-13 PROCEDURE — 7210000100 HC LABOR FEE PER 1 HR

## 2024-12-13 PROCEDURE — 36415 COLL VENOUS BLD VENIPUNCTURE: CPT

## 2024-12-13 PROCEDURE — 86901 BLOOD TYPING SEROLOGIC RH(D): CPT

## 2024-12-13 PROCEDURE — C1726 CATH, BAL DIL, NON-VASCULAR: HCPCS

## 2024-12-13 PROCEDURE — 1100000000 HC RM PRIVATE

## 2024-12-13 PROCEDURE — 84156 ASSAY OF PROTEIN URINE: CPT

## 2024-12-13 PROCEDURE — 82570 ASSAY OF URINE CREATININE: CPT

## 2024-12-13 PROCEDURE — 86900 BLOOD TYPING SEROLOGIC ABO: CPT

## 2024-12-13 PROCEDURE — 86850 RBC ANTIBODY SCREEN: CPT

## 2024-12-13 RX ORDER — FAMOTIDINE 20 MG/1
20 TABLET, FILM COATED ORAL 2 TIMES DAILY
Status: DISCONTINUED | OUTPATIENT
Start: 2024-12-13 | End: 2024-12-15

## 2024-12-13 RX ORDER — ESCITALOPRAM OXALATE 10 MG/1
15 TABLET ORAL NIGHTLY
Status: DISCONTINUED | OUTPATIENT
Start: 2024-12-13 | End: 2024-12-19 | Stop reason: HOSPADM

## 2024-12-13 RX ORDER — SODIUM CHLORIDE, SODIUM LACTATE, POTASSIUM CHLORIDE, AND CALCIUM CHLORIDE .6; .31; .03; .02 G/100ML; G/100ML; G/100ML; G/100ML
500 INJECTION, SOLUTION INTRAVENOUS PRN
Status: DISCONTINUED | OUTPATIENT
Start: 2024-12-13 | End: 2024-12-15

## 2024-12-13 RX ORDER — ONDANSETRON 4 MG/1
4 TABLET, ORALLY DISINTEGRATING ORAL EVERY 6 HOURS PRN
Status: DISCONTINUED | OUTPATIENT
Start: 2024-12-13 | End: 2024-12-15

## 2024-12-13 RX ORDER — MISOPROSTOL 200 UG/1
400 TABLET ORAL PRN
Status: DISCONTINUED | OUTPATIENT
Start: 2024-12-13 | End: 2024-12-15

## 2024-12-13 RX ORDER — METHYLERGONOVINE MALEATE 0.2 MG/ML
200 INJECTION INTRAVENOUS PRN
Status: DISCONTINUED | OUTPATIENT
Start: 2024-12-13 | End: 2024-12-15

## 2024-12-13 RX ORDER — ONDANSETRON 2 MG/ML
4 INJECTION INTRAMUSCULAR; INTRAVENOUS EVERY 6 HOURS PRN
Status: DISCONTINUED | OUTPATIENT
Start: 2024-12-13 | End: 2024-12-15

## 2024-12-13 RX ORDER — LABETALOL 100 MG/1
100 TABLET, FILM COATED ORAL EVERY 12 HOURS SCHEDULED
Status: DISCONTINUED | OUTPATIENT
Start: 2024-12-13 | End: 2024-12-15

## 2024-12-13 RX ORDER — SODIUM CHLORIDE, SODIUM LACTATE, POTASSIUM CHLORIDE, CALCIUM CHLORIDE 600; 310; 30; 20 MG/100ML; MG/100ML; MG/100ML; MG/100ML
INJECTION, SOLUTION INTRAVENOUS CONTINUOUS
Status: DISCONTINUED | OUTPATIENT
Start: 2024-12-13 | End: 2024-12-15

## 2024-12-13 RX ORDER — CARBOPROST TROMETHAMINE 250 UG/ML
250 INJECTION, SOLUTION INTRAMUSCULAR PRN
Status: DISCONTINUED | OUTPATIENT
Start: 2024-12-13 | End: 2024-12-15

## 2024-12-13 RX ORDER — TERBUTALINE SULFATE 1 MG/ML
0.25 INJECTION, SOLUTION SUBCUTANEOUS
Status: COMPLETED | OUTPATIENT
Start: 2024-12-13 | End: 2024-12-15

## 2024-12-13 RX ORDER — SODIUM CHLORIDE 0.9 % (FLUSH) 0.9 %
5-40 SYRINGE (ML) INJECTION PRN
Status: DISCONTINUED | OUTPATIENT
Start: 2024-12-13 | End: 2024-12-15

## 2024-12-13 RX ORDER — NIFEDIPINE 30 MG/1
60 TABLET, EXTENDED RELEASE ORAL DAILY
Status: DISCONTINUED | OUTPATIENT
Start: 2024-12-13 | End: 2024-12-13

## 2024-12-13 RX ORDER — ESCITALOPRAM OXALATE 10 MG/1
15 TABLET ORAL DAILY
Status: DISCONTINUED | OUTPATIENT
Start: 2024-12-13 | End: 2024-12-13

## 2024-12-13 RX ORDER — DOCUSATE SODIUM 100 MG/1
100 CAPSULE, LIQUID FILLED ORAL 2 TIMES DAILY
Status: DISCONTINUED | OUTPATIENT
Start: 2024-12-13 | End: 2024-12-15

## 2024-12-13 RX ORDER — SODIUM CHLORIDE 9 MG/ML
INJECTION, SOLUTION INTRAVENOUS CONTINUOUS
Status: DISCONTINUED | OUTPATIENT
Start: 2024-12-13 | End: 2024-12-15

## 2024-12-13 RX ORDER — SODIUM CHLORIDE 0.9 % (FLUSH) 0.9 %
5-40 SYRINGE (ML) INJECTION EVERY 12 HOURS SCHEDULED
Status: DISCONTINUED | OUTPATIENT
Start: 2024-12-13 | End: 2024-12-15

## 2024-12-13 RX ORDER — NALBUPHINE HYDROCHLORIDE 10 MG/ML
10 INJECTION INTRAMUSCULAR; INTRAVENOUS; SUBCUTANEOUS
Status: DISCONTINUED | OUTPATIENT
Start: 2024-12-13 | End: 2024-12-15

## 2024-12-13 RX ORDER — ESCITALOPRAM OXALATE 10 MG/1
5 TABLET ORAL DAILY
Status: DISCONTINUED | OUTPATIENT
Start: 2024-12-13 | End: 2024-12-13

## 2024-12-13 RX ORDER — SODIUM CHLORIDE 9 MG/ML
25 INJECTION, SOLUTION INTRAVENOUS PRN
Status: DISCONTINUED | OUTPATIENT
Start: 2024-12-13 | End: 2024-12-15

## 2024-12-13 RX ORDER — DIPHENHYDRAMINE HCL 25 MG
25 CAPSULE ORAL NIGHTLY PRN
Status: DISCONTINUED | OUTPATIENT
Start: 2024-12-13 | End: 2024-12-15

## 2024-12-13 RX ORDER — NIFEDIPINE 30 MG/1
60 TABLET, EXTENDED RELEASE ORAL NIGHTLY
Status: DISCONTINUED | OUTPATIENT
Start: 2024-12-13 | End: 2024-12-15

## 2024-12-13 RX ADMIN — FAMOTIDINE 20 MG: 20 TABLET, FILM COATED ORAL at 21:25

## 2024-12-13 RX ADMIN — Medication 25 MCG: at 16:18

## 2024-12-13 RX ADMIN — ESCITALOPRAM OXALATE 15 MG: 10 TABLET ORAL at 21:24

## 2024-12-13 RX ADMIN — NIFEDIPINE 60 MG: 30 TABLET, FILM COATED, EXTENDED RELEASE ORAL at 21:25

## 2024-12-13 RX ADMIN — DOCUSATE SODIUM 100 MG: 100 CAPSULE, LIQUID FILLED ORAL at 21:25

## 2024-12-13 RX ADMIN — LABETALOL HYDROCHLORIDE 100 MG: 100 TABLET, FILM COATED ORAL at 21:25

## 2024-12-13 NOTE — H&P
3:54 PM    PIH labs returned normal with P/C at baseline 0.1.   Pt continues to be without PIH symptoms  Cat 1 fetal tracing  Bps normotensive -90's diastolic.     Reviewed case with M, Dr. Bonilla. Given CHTN on two medications, elevated Bps today and 38w tomorrow, recommended proceeding with induction.     Reviewed at length with the patient. Pt and  were in agreement.   Cook catheter placed 60/60ccs, RFS throughout.   Plan cytotec overnight, pitocin in AM.   Epidural and AROM PRN.     Expectant . Pt understands she can have a CD at any point.     Sruthi Boone MD

## 2024-12-13 NOTE — PROGRESS NOTES
1130-Pt here from Dr. Boone's office for serial Bps and lab work.  Pt recently discharged from labor and delivery for failed induction. G1.  MAGGIE 12/28/24.  Denies HA, visual changes, epigastric pain or swelling.   1300-Dr Boone at bedside, viewed strip and Bps.  Waiting for all labs to be resulted.   1325-labs resulted, message sent to Dr Boone to view labs.   1332-Pt off monitor to use bathroom.   1415-Dr Boone at bedside, discussing POC. Plan to do cervical ripening balloon and cytotec in a few hours. Admit to inpatient.   1425-Dr Boone at bedside.  CRB placed  60ml in uterine and 60 ml in vaginal.  Pt tolerated well.  Zackery start cytotec in 2-3 hours and pitocin in am. Pt agrees with plan.   1600-Pt up to bathroom.  1620-Cytotec 25 mcg given per order. Pt states only mild occasional cramping.   1930-Bedside and Verbal shift change report given to ANGEL Robbins RN  (oncoming nurse) by SRINATH Dow RN  (offgoing nurse). Report included the following information Nurse Handoff Report.

## 2024-12-13 NOTE — H&P
History & Physical    Name: Sudha Barriga MRN: [id number]  SSN: [id number]    YOB: 1985  Age: 39 y.o.  Sex: female      Subjective:     Chief Complaint:  Pregnancy and CHTN    Estimated Date of Delivery: 24  OB History    Para Term  AB Living   1             SAB IAB Ectopic Molar Multiple Live Births                    # Outcome Date GA Lbr Julien/2nd Weight Sex Type Anes PTL Lv   1 Current                Ms. Barriga is admitted with pregnancy at 37w6d G1 with CHTN on two medications, IVF, AMA sent to L&D for further evaluation of blood pressures.     Pt has known CHTN, of which she is on labetalol 100mg BID and procardia 60mg XL daily.   She had a prior induction  of which she had the cook catheter twice and cytotec with no cervical change. After 4 days of induction, she was given the option to continue,  section, or be discharged with follow up today in the office. She elected to be discharged.     Today in the office, she had elevated blood pressures above her baseline. Her Bps were 128/94, 142/98.  She had no HA, VC, RUQ pain or BLE edema.  She was sent to L&D for serial Bps and repeat PIH labs. Bpp , cephalic.    Patient and her  Chemo are expecting a baby boy \"Chemo Douglas\".      Prenatal course pertinent for   CHTN - on labetalol 100mg BID and Nifedipine 60mg XRBaseline Ur pr/cr 0.18  AMA - daily 81mg ASA  IVF pregnancy - normal NIPT  Elevated 1hr glucola > normal 3hr GTT  Anxiety - on lexapro      Received RSV and Tdap on         Past Medical History:   Diagnosis Date    Hypertension     Infertility, female      Past Surgical History:   Procedure Laterality Date    HYSTEROSCOPY      WISDOM TOOTH EXTRACTION       Social History     Occupational History    Not on file   Tobacco Use    Smoking status: Never    Smokeless tobacco: Never   Substance and Sexual Activity    Alcohol use: Not Currently    Drug use: Never    Sexual activity: Yes     Partners: Male

## 2024-12-14 PROCEDURE — 6370000000 HC RX 637 (ALT 250 FOR IP): Performed by: OBSTETRICS & GYNECOLOGY

## 2024-12-14 PROCEDURE — 1100000000 HC RM PRIVATE

## 2024-12-14 PROCEDURE — 7210000100 HC LABOR FEE PER 1 HR

## 2024-12-14 PROCEDURE — 6370000000 HC RX 637 (ALT 250 FOR IP): Performed by: ADVANCED PRACTICE MIDWIFE

## 2024-12-14 PROCEDURE — 2580000003 HC RX 258: Performed by: OBSTETRICS & GYNECOLOGY

## 2024-12-14 RX ORDER — ASPIRIN 81 MG/1
81 TABLET ORAL DAILY
Status: ON HOLD | COMMUNITY
End: 2024-12-19 | Stop reason: HOSPADM

## 2024-12-14 RX ORDER — TRIAMCINOLONE ACETONIDE 1 MG/G
OINTMENT TOPICAL 3 TIMES DAILY PRN
Status: DISCONTINUED | OUTPATIENT
Start: 2024-12-14 | End: 2024-12-15

## 2024-12-14 RX ADMIN — DIPHENHYDRAMINE HYDROCHLORIDE 25 MG: 25 CAPSULE ORAL at 23:42

## 2024-12-14 RX ADMIN — TRIAMCINOLONE ACETONIDE: 1 OINTMENT TOPICAL at 21:33

## 2024-12-14 RX ADMIN — ESCITALOPRAM OXALATE 15 MG: 10 TABLET ORAL at 21:30

## 2024-12-14 RX ADMIN — Medication 50 MCG: at 16:25

## 2024-12-14 RX ADMIN — FAMOTIDINE 20 MG: 20 TABLET, FILM COATED ORAL at 21:30

## 2024-12-14 RX ADMIN — FAMOTIDINE 20 MG: 20 TABLET, FILM COATED ORAL at 09:12

## 2024-12-14 RX ADMIN — Medication 25 MCG: at 07:30

## 2024-12-14 RX ADMIN — DIPHENHYDRAMINE HYDROCHLORIDE 25 MG: 25 CAPSULE ORAL at 00:13

## 2024-12-14 RX ADMIN — NIFEDIPINE 60 MG: 30 TABLET, FILM COATED, EXTENDED RELEASE ORAL at 21:30

## 2024-12-14 RX ADMIN — Medication 25 MCG: at 02:36

## 2024-12-14 RX ADMIN — SODIUM CHLORIDE, PRESERVATIVE FREE 10 ML: 5 INJECTION INTRAVENOUS at 09:15

## 2024-12-14 RX ADMIN — LABETALOL HYDROCHLORIDE 100 MG: 100 TABLET, FILM COATED ORAL at 21:30

## 2024-12-14 RX ADMIN — DOCUSATE SODIUM 100 MG: 100 CAPSULE, LIQUID FILLED ORAL at 21:30

## 2024-12-14 RX ADMIN — TRIAMCINOLONE ACETONIDE: 1 OINTMENT TOPICAL at 15:30

## 2024-12-14 RX ADMIN — Medication 50 MCG: at 11:50

## 2024-12-14 RX ADMIN — LABETALOL HYDROCHLORIDE 100 MG: 100 TABLET, FILM COATED ORAL at 09:15

## 2024-12-14 RX ADMIN — Medication 50 MCG: at 23:04

## 2024-12-14 NOTE — PROGRESS NOTES
In to meet pt shortly after 2100.  She is comfortable with cytotec x 1, reports feeling some menstrual-like cramps only.  She is wondering if she could get the next dose of cytotec placed vaginally.  FHT CAT I tracing  TOCO q 2-3 minutes  Cx deferred    Will plan next dose of cytotec vaginally once contractions space out more.

## 2024-12-14 NOTE — PLAN OF CARE
Problem: Risk for Decreased Cardiac Output  Goal: Maintains optimal cardiac output and hemodynamic stability  Description: INTERVENTIONS:.  -Monitor blood pressure and heart rate  -Monitor urine output and notify licensed practitioner for values outside of   -Assess for signes of decreased cardiac output  -Administer fluid and /or volume expanders as ordered    Flowsheets (Taken 12/14/2024 0046)  Maintains optimal cardiac output and hemodynamic stability:   Monitor blood pressure and heart rate   Assess for signs of decreased cardiac output   Monitor urine output and notify Licensed Independent Practitioner for values outside of normal range   Administer fluid and/or volume expanders as ordered  Goal: Achieves optimal ventilation and oxygenation  Description: INTERVENTIONS:.  -Assess for changes in respiratory status   -Assess for changes in mentation and behavior  -Position to facilitate oxygenation and minimize respiratory effort  -Oxygen supplementation based on oxygen saturation or arterial blood gases  -Initiate smoking cessation protocol as indicated  -Encourage broncho-pulmonary hygiene including cough, deep breathe, incentive spirometry  -Assess the need for suctioning and aspirate as needed  -Assess and instruct to report shortness of breath or any respiratory difficulty  -Respiratory therapy support as indicated      Flowsheets (Taken 12/14/2024 0046)  Achieves optimal ventilation and oxygenation:   Assess for changes in respiratory status   Assess for changes in mentation and behavior   Oxygen supplementation based on oxygen saturation or arterial blood gases     Problem: Risk for Deficient Fluid Volume  Goal: Hemodynamic stability and optimal renal function maintained  Description: Interventions:.  -Monitor labs and assess for signs and symptoms of volume excess or deficit    -Monitor intake, output and patient weight  -Monitor response to interventions for patient's volume status, including labs,

## 2024-12-14 NOTE — PROGRESS NOTES
0735 Bedside and Verbal shift change report received from EDWIN Robbins RN. Report included the following information Nurse Handoff Report, Intake/Output, MAR, and Recent Results.    0800 Denies headache, visual disturbance,epigastric pain, nausea/vomiting.   Aware of intermittent mild lower abdominal cramping. Denies vaginal bleeding or leaking of fluid from her vagina.   0900 Pepcid 20 mg PO and Labetalol 100 mg PO given.  0946 LUBA Frank CNM into see patient, discussing plan of care, cervical ripening balloon removed intact by LUBA Frank CNM with gentle traction.   0948 Novii monitor removed, skin red and irritated from electrodes, several small abraisions noted. LUBA Frank CNM at bedside, will place order for steriod cream.  1015 Up to shower, Labor bed replaced with hospital bed for comfort  1035 Up to ambulate, instructed to return around 1100.  1106 External fetal monitor reapplied, aware of occasional mild abdominal cramping  1150 Misoprostol 50 mcg buccal given  1416 Comfortable, denies feeling contractions. External fetal monitor of, up to ambulate  1530 Kenalog ointment applied to abdomen , LLQ,LLQ, RU, RLQ.   1536 External fetal monitor reapplied.   1625 Misoprostol 50 mcg buccal given  1800 Standing at bedside, aware of mild abdominal cramping but coping well.  1815  SRINATH Frank CNM on unit and updated on UC pattern and comfort level.  1830 External fetal monitor off, up to ambulate  1920 Returned from ambulating, states she had some bleeding, small amount of bright red blood noted on brandon pad. Aware of contractions but no significant change in comfort.   1925 New brandon pad applied  1929 External fetal monitor applied  1935 Bedside and Verbal shift change report given to EDWIN Robbins RN. By DJSeaver RNC. Report included the following information Nurse Handoff Report, Intake/Output, MAR, and Recent Results.

## 2024-12-14 NOTE — PROGRESS NOTES
1930 BSR received from Bon. Pt is accompanied by her . Reviewed plan of care with pt. Encouraged pt to ambulate and use birthing ball. Miso due at 2030. Pt contraction 2-3 mins. Pt states that the contractions feel like cramps.     0013 RN checked woodard bulb. Still in place.     0236 Second dose of miso     0730 third dose of miso     0735 Bedside and Verbal shift change report given to Caitie LAWRENCE  (oncoming nurse) by EDWIN Robbins RN  (offgoing nurse). Report included the following information Nurse Handoff Report, MAR, Results, Plan of care.

## 2024-12-14 NOTE — PROGRESS NOTES
Department of Obstetrics and Gynecology  Labor and Delivery   CNM Progress Note      SUBJECTIVE:  pt states \"I am not feeling contractions this am\"    OBJECTIVE:      Vitals:    /67   Pulse 78   Temp 97.9 °F (36.6 °C) (Oral)   Resp 16   SpO2 100%     Fetal heart rate:       Baseline Heart Rate:  125        Accelerations:  present       Long Term Variability:  moderate       Decelerations:  absent         Contraction frequency: 2 - 3 minutes    Fetal Presentation:  Cephalic,       Membranes:  Intact    Cervix:           Dilation:  3 cm         Effacement:  50%         Station:  -3         Consistency:  medium         Position:  mid  Cook removed with both balloons intact, using gentle traction     Abdomen:  areas that are red and mildly inflamed from NOVII patches.  Pt reports mildly tender.      ASSESSMENT & PLAN:    Latent labor,  cook catheter out after being in x 17 hours in.  Will give 2 more doses of miso 50 mcg bucally, then transition to pitocin.  Will consider arom when appropriate.  Will add steroid  cream for abdominal abrasions.

## 2024-12-15 ENCOUNTER — ANESTHESIA (OUTPATIENT)
Facility: HOSPITAL | Age: 39
End: 2024-12-15
Payer: COMMERCIAL

## 2024-12-15 ENCOUNTER — ANESTHESIA EVENT (OUTPATIENT)
Facility: HOSPITAL | Age: 39
End: 2024-12-15
Payer: COMMERCIAL

## 2024-12-15 PROCEDURE — 6360000002 HC RX W HCPCS

## 2024-12-15 PROCEDURE — 2580000003 HC RX 258: Performed by: OBSTETRICS & GYNECOLOGY

## 2024-12-15 PROCEDURE — 6370000000 HC RX 637 (ALT 250 FOR IP): Performed by: OBSTETRICS & GYNECOLOGY

## 2024-12-15 PROCEDURE — 10H073Z INSERTION OF MONITORING ELECTRODE INTO PRODUCTS OF CONCEPTION, VIA NATURAL OR ARTIFICIAL OPENING: ICD-10-PCS | Performed by: OBSTETRICS & GYNECOLOGY

## 2024-12-15 PROCEDURE — 7210000100 HC LABOR FEE PER 1 HR

## 2024-12-15 PROCEDURE — 3700000025 EPIDURAL BLOCK: Performed by: ANESTHESIOLOGY

## 2024-12-15 PROCEDURE — 1120000000 HC RM PRIVATE OB

## 2024-12-15 PROCEDURE — 6360000002 HC RX W HCPCS: Performed by: ADVANCED PRACTICE MIDWIFE

## 2024-12-15 PROCEDURE — 6360000002 HC RX W HCPCS: Performed by: ANESTHESIOLOGY

## 2024-12-15 PROCEDURE — 2709999900 HC NON-CHARGEABLE SUPPLY: Performed by: OBSTETRICS & GYNECOLOGY

## 2024-12-15 PROCEDURE — 3609079900 HC CESAREAN SECTION: Performed by: OBSTETRICS & GYNECOLOGY

## 2024-12-15 PROCEDURE — 6360000002 HC RX W HCPCS: Performed by: OBSTETRICS & GYNECOLOGY

## 2024-12-15 PROCEDURE — 51701 INSERT BLADDER CATHETER: CPT

## 2024-12-15 PROCEDURE — 2580000003 HC RX 258: Performed by: ANESTHESIOLOGY

## 2024-12-15 PROCEDURE — 4A1HXCZ MONITORING OF PRODUCTS OF CONCEPTION, CARDIAC RATE, EXTERNAL APPROACH: ICD-10-PCS | Performed by: OBSTETRICS & GYNECOLOGY

## 2024-12-15 PROCEDURE — 99465 NB RESUSCITATION: CPT

## 2024-12-15 PROCEDURE — 3700000000 HC ANESTHESIA ATTENDED CARE: Performed by: OBSTETRICS & GYNECOLOGY

## 2024-12-15 PROCEDURE — 3700000001 HC ADD 15 MINUTES (ANESTHESIA): Performed by: OBSTETRICS & GYNECOLOGY

## 2024-12-15 PROCEDURE — 7100000000 HC PACU RECOVERY - FIRST 15 MIN: Performed by: OBSTETRICS & GYNECOLOGY

## 2024-12-15 PROCEDURE — 2500000003 HC RX 250 WO HCPCS: Performed by: ANESTHESIOLOGY

## 2024-12-15 PROCEDURE — 2580000003 HC RX 258: Performed by: ADVANCED PRACTICE MIDWIFE

## 2024-12-15 PROCEDURE — 7100000001 HC PACU RECOVERY - ADDTL 15 MIN: Performed by: OBSTETRICS & GYNECOLOGY

## 2024-12-15 RX ORDER — MORPHINE SULFATE 1 MG/ML
INJECTION, SOLUTION EPIDURAL; INTRATHECAL; INTRAVENOUS
Status: DISCONTINUED | OUTPATIENT
Start: 2024-12-15 | End: 2024-12-15 | Stop reason: SDUPTHER

## 2024-12-15 RX ORDER — FERROUS SULFATE 325(65) MG
325 TABLET ORAL
Status: DISCONTINUED | OUTPATIENT
Start: 2024-12-16 | End: 2024-12-19 | Stop reason: HOSPADM

## 2024-12-15 RX ORDER — CEFAZOLIN SODIUM 1 G/3ML
INJECTION, POWDER, FOR SOLUTION INTRAMUSCULAR; INTRAVENOUS
Status: DISCONTINUED | OUTPATIENT
Start: 2024-12-15 | End: 2024-12-15 | Stop reason: SDUPTHER

## 2024-12-15 RX ORDER — EPHEDRINE SULFATE 50 MG/ML
5 INJECTION INTRAVENOUS PRN
Status: DISCONTINUED | OUTPATIENT
Start: 2024-12-16 | End: 2024-12-15 | Stop reason: HOSPADM

## 2024-12-15 RX ORDER — NIFEDIPINE 30 MG/1
30 TABLET, EXTENDED RELEASE ORAL NIGHTLY
Status: DISCONTINUED | OUTPATIENT
Start: 2024-12-15 | End: 2024-12-19 | Stop reason: HOSPADM

## 2024-12-15 RX ORDER — MODIFIED LANOLIN
OINTMENT (GRAM) TOPICAL
Status: DISCONTINUED | OUTPATIENT
Start: 2024-12-15 | End: 2024-12-19 | Stop reason: HOSPADM

## 2024-12-15 RX ORDER — OXYCODONE HYDROCHLORIDE 5 MG/1
5 TABLET ORAL EVERY 4 HOURS PRN
Status: DISCONTINUED | OUTPATIENT
Start: 2024-12-15 | End: 2024-12-19 | Stop reason: HOSPADM

## 2024-12-15 RX ORDER — ONDANSETRON 2 MG/ML
INJECTION INTRAMUSCULAR; INTRAVENOUS
Status: DISCONTINUED | OUTPATIENT
Start: 2024-12-15 | End: 2024-12-15 | Stop reason: SDUPTHER

## 2024-12-15 RX ORDER — NALOXONE HYDROCHLORIDE 0.4 MG/ML
INJECTION, SOLUTION INTRAMUSCULAR; INTRAVENOUS; SUBCUTANEOUS PRN
Status: ACTIVE | OUTPATIENT
Start: 2024-12-15 | End: 2024-12-16

## 2024-12-15 RX ORDER — LIDOCAINE HCL/EPINEPHRINE/PF 2%-1:200K
VIAL (ML) INJECTION
Status: COMPLETED
Start: 2024-12-15 | End: 2024-12-15

## 2024-12-15 RX ORDER — FENTANYL CITRATE 50 UG/ML
INJECTION, SOLUTION INTRAMUSCULAR; INTRAVENOUS
Status: COMPLETED
Start: 2024-12-15 | End: 2024-12-15

## 2024-12-15 RX ORDER — TERBUTALINE SULFATE 1 MG/ML
INJECTION, SOLUTION SUBCUTANEOUS
Status: COMPLETED
Start: 2024-12-15 | End: 2024-12-15

## 2024-12-15 RX ORDER — HYDROMORPHONE HYDROCHLORIDE 1 MG/ML
1 INJECTION, SOLUTION INTRAMUSCULAR; INTRAVENOUS; SUBCUTANEOUS EVERY 4 HOURS PRN
Status: DISCONTINUED | OUTPATIENT
Start: 2024-12-15 | End: 2024-12-15

## 2024-12-15 RX ORDER — NALBUPHINE HYDROCHLORIDE 10 MG/ML
5 INJECTION INTRAMUSCULAR; INTRAVENOUS; SUBCUTANEOUS EVERY 4 HOURS PRN
Status: ACTIVE | OUTPATIENT
Start: 2024-12-15 | End: 2024-12-16

## 2024-12-15 RX ORDER — SODIUM CHLORIDE 0.9 % (FLUSH) 0.9 %
5-40 SYRINGE (ML) INJECTION PRN
Status: DISCONTINUED | OUTPATIENT
Start: 2024-12-15 | End: 2024-12-19 | Stop reason: HOSPADM

## 2024-12-15 RX ORDER — LIDOCAINE HCL/EPINEPHRINE/PF 2%-1:200K
VIAL (ML) INJECTION
Status: DISCONTINUED | OUTPATIENT
Start: 2024-12-15 | End: 2024-12-15 | Stop reason: SDUPTHER

## 2024-12-15 RX ORDER — SWAB
1 SWAB, NON-MEDICATED MISCELLANEOUS DAILY
Status: DISCONTINUED | OUTPATIENT
Start: 2024-12-16 | End: 2024-12-19 | Stop reason: HOSPADM

## 2024-12-15 RX ORDER — SODIUM CHLORIDE 9 MG/ML
INJECTION, SOLUTION INTRAVENOUS PRN
Status: DISCONTINUED | OUTPATIENT
Start: 2024-12-15 | End: 2024-12-19 | Stop reason: HOSPADM

## 2024-12-15 RX ORDER — ACETAMINOPHEN 500 MG
1000 TABLET ORAL EVERY 8 HOURS SCHEDULED
Status: DISCONTINUED | OUTPATIENT
Start: 2024-12-15 | End: 2024-12-19 | Stop reason: HOSPADM

## 2024-12-15 RX ORDER — FENTANYL/BUPIVACAINE/NS/PF 2-1250MCG
1-15 PLASTIC BAG, INJECTION (ML) INJECTION CONTINUOUS
Status: DISCONTINUED | OUTPATIENT
Start: 2024-12-15 | End: 2024-12-15 | Stop reason: HOSPADM

## 2024-12-15 RX ORDER — NALOXONE HYDROCHLORIDE 0.4 MG/ML
INJECTION, SOLUTION INTRAMUSCULAR; INTRAVENOUS; SUBCUTANEOUS PRN
Status: DISCONTINUED | OUTPATIENT
Start: 2024-12-15 | End: 2024-12-15 | Stop reason: HOSPADM

## 2024-12-15 RX ORDER — BUPIVACAINE HYDROCHLORIDE 5 MG/ML
INJECTION, SOLUTION EPIDURAL; INTRACAUDAL
Status: DISCONTINUED | OUTPATIENT
Start: 2024-12-15 | End: 2024-12-15 | Stop reason: SDUPTHER

## 2024-12-15 RX ORDER — FENTANYL CITRATE 50 UG/ML
INJECTION, SOLUTION INTRAMUSCULAR; INTRAVENOUS
Status: DISCONTINUED | OUTPATIENT
Start: 2024-12-15 | End: 2024-12-15 | Stop reason: SDUPTHER

## 2024-12-15 RX ORDER — SODIUM CHLORIDE 0.9 % (FLUSH) 0.9 %
5-40 SYRINGE (ML) INJECTION EVERY 12 HOURS SCHEDULED
Status: DISCONTINUED | OUTPATIENT
Start: 2024-12-15 | End: 2024-12-19 | Stop reason: HOSPADM

## 2024-12-15 RX ORDER — KETOROLAC TROMETHAMINE 30 MG/ML
INJECTION, SOLUTION INTRAMUSCULAR; INTRAVENOUS
Status: DISCONTINUED | OUTPATIENT
Start: 2024-12-15 | End: 2024-12-15 | Stop reason: SDUPTHER

## 2024-12-15 RX ORDER — ONDANSETRON 2 MG/ML
4 INJECTION INTRAMUSCULAR; INTRAVENOUS EVERY 6 HOURS PRN
Status: DISCONTINUED | OUTPATIENT
Start: 2024-12-15 | End: 2024-12-19 | Stop reason: HOSPADM

## 2024-12-15 RX ORDER — ONDANSETRON 4 MG/1
4 TABLET, ORALLY DISINTEGRATING ORAL EVERY 8 HOURS PRN
Status: DISCONTINUED | OUTPATIENT
Start: 2024-12-15 | End: 2024-12-19 | Stop reason: HOSPADM

## 2024-12-15 RX ORDER — SODIUM CHLORIDE, SODIUM LACTATE, POTASSIUM CHLORIDE, CALCIUM CHLORIDE 600; 310; 30; 20 MG/100ML; MG/100ML; MG/100ML; MG/100ML
INJECTION, SOLUTION INTRAVENOUS CONTINUOUS
Status: DISCONTINUED | OUTPATIENT
Start: 2024-12-15 | End: 2024-12-19 | Stop reason: HOSPADM

## 2024-12-15 RX ORDER — BUPIVACAINE HYDROCHLORIDE 5 MG/ML
INJECTION, SOLUTION EPIDURAL; INTRACAUDAL
Status: COMPLETED
Start: 2024-12-15 | End: 2024-12-15

## 2024-12-15 RX ORDER — EPHEDRINE SULFATE 50 MG/ML
10 INJECTION INTRAVENOUS
Status: COMPLETED | OUTPATIENT
Start: 2024-12-15 | End: 2024-12-15

## 2024-12-15 RX ORDER — DOCUSATE SODIUM 100 MG/1
100 CAPSULE, LIQUID FILLED ORAL 2 TIMES DAILY
Status: DISCONTINUED | OUTPATIENT
Start: 2024-12-15 | End: 2024-12-19 | Stop reason: HOSPADM

## 2024-12-15 RX ORDER — MISOPROSTOL 200 UG/1
400 TABLET ORAL PRN
Status: DISCONTINUED | OUTPATIENT
Start: 2024-12-15 | End: 2024-12-19 | Stop reason: HOSPADM

## 2024-12-15 RX ORDER — KETOROLAC TROMETHAMINE 30 MG/ML
30 INJECTION, SOLUTION INTRAMUSCULAR; INTRAVENOUS EVERY 6 HOURS
Status: COMPLETED | OUTPATIENT
Start: 2024-12-15 | End: 2024-12-16

## 2024-12-15 RX ORDER — DEXAMETHASONE SODIUM PHOSPHATE 4 MG/ML
INJECTION, SOLUTION INTRA-ARTICULAR; INTRALESIONAL; INTRAMUSCULAR; INTRAVENOUS; SOFT TISSUE
Status: DISCONTINUED | OUTPATIENT
Start: 2024-12-15 | End: 2024-12-15 | Stop reason: SDUPTHER

## 2024-12-15 RX ORDER — IBUPROFEN 400 MG/1
800 TABLET, FILM COATED ORAL EVERY 8 HOURS
Status: DISCONTINUED | OUTPATIENT
Start: 2024-12-16 | End: 2024-12-19 | Stop reason: HOSPADM

## 2024-12-15 RX ORDER — OXYCODONE HYDROCHLORIDE 5 MG/1
10 TABLET ORAL EVERY 4 HOURS PRN
Status: DISCONTINUED | OUTPATIENT
Start: 2024-12-15 | End: 2024-12-19 | Stop reason: HOSPADM

## 2024-12-15 RX ORDER — DIPHENHYDRAMINE HYDROCHLORIDE 50 MG/ML
25 INJECTION INTRAMUSCULAR; INTRAVENOUS EVERY 6 HOURS PRN
Status: DISCONTINUED | OUTPATIENT
Start: 2024-12-15 | End: 2024-12-19 | Stop reason: HOSPADM

## 2024-12-15 RX ADMIN — OXYTOCIN 1 MILLI-UNITS/MIN: 10 INJECTION, SOLUTION INTRAMUSCULAR; INTRAVENOUS at 13:02

## 2024-12-15 RX ADMIN — DOCUSATE SODIUM 100 MG: 100 CAPSULE, LIQUID FILLED ORAL at 21:35

## 2024-12-15 RX ADMIN — FENTANYL CITRATE 100 MCG: 50 INJECTION, SOLUTION INTRAMUSCULAR; INTRAVENOUS at 08:39

## 2024-12-15 RX ADMIN — LIDOCAINE HYDROCHLORIDE AND EPINEPHRINE 15 ML: 20; 5 INJECTION, SOLUTION EPIDURAL; INFILTRATION; INTRACAUDAL; PERINEURAL at 15:35

## 2024-12-15 RX ADMIN — BUPIVACAINE HYDROCHLORIDE 5 ML: 5 INJECTION, SOLUTION EPIDURAL; INTRACAUDAL; PERINEURAL at 08:39

## 2024-12-15 RX ADMIN — SODIUM CHLORIDE, POTASSIUM CHLORIDE, SODIUM LACTATE AND CALCIUM CHLORIDE: 600; 310; 30; 20 INJECTION, SOLUTION INTRAVENOUS at 08:40

## 2024-12-15 RX ADMIN — ONDANSETRON 4 MG: 2 INJECTION INTRAMUSCULAR; INTRAVENOUS at 04:39

## 2024-12-15 RX ADMIN — FENTANYL CITRATE 100 MCG: 50 INJECTION, SOLUTION INTRAMUSCULAR; INTRAVENOUS at 15:43

## 2024-12-15 RX ADMIN — HYDROMORPHONE HYDROCHLORIDE 1 MG: 1 INJECTION, SOLUTION INTRAMUSCULAR; INTRAVENOUS; SUBCUTANEOUS at 04:28

## 2024-12-15 RX ADMIN — SODIUM CHLORIDE, PRESERVATIVE FREE 10 ML: 5 INJECTION INTRAVENOUS at 07:44

## 2024-12-15 RX ADMIN — HYDROMORPHONE HYDROCHLORIDE 1 MG: 1 INJECTION, SOLUTION INTRAMUSCULAR; INTRAVENOUS; SUBCUTANEOUS at 04:27

## 2024-12-15 RX ADMIN — ESCITALOPRAM OXALATE 15 MG: 10 TABLET ORAL at 21:35

## 2024-12-15 RX ADMIN — SODIUM CHLORIDE, POTASSIUM CHLORIDE, SODIUM LACTATE AND CALCIUM CHLORIDE: 600; 310; 30; 20 INJECTION, SOLUTION INTRAVENOUS at 18:30

## 2024-12-15 RX ADMIN — NIFEDIPINE 30 MG: 30 TABLET, FILM COATED, EXTENDED RELEASE ORAL at 21:35

## 2024-12-15 RX ADMIN — LIDOCAINE HYDROCHLORIDE AND EPINEPHRINE 5 ML: 20; 5 INJECTION, SOLUTION EPIDURAL; INFILTRATION; INTRACAUDAL; PERINEURAL at 08:36

## 2024-12-15 RX ADMIN — DEXAMETHASONE SODIUM PHOSPHATE 4 MG: 4 INJECTION INTRA-ARTICULAR; INTRALESIONAL; INTRAMUSCULAR; INTRAVENOUS; SOFT TISSUE at 16:28

## 2024-12-15 RX ADMIN — BUPIVACAINE HYDROCHLORIDE 10 ML/HR: 5 INJECTION, SOLUTION EPIDURAL; INTRACAUDAL; PERINEURAL at 09:05

## 2024-12-15 RX ADMIN — ONDANSETRON HYDROCHLORIDE 4 MG: 2 INJECTION, SOLUTION INTRAMUSCULAR; INTRAVENOUS at 15:42

## 2024-12-15 RX ADMIN — MORPHINE SULFATE 4 MG: 1 INJECTION EPIDURAL; INTRATHECAL; INTRAVENOUS at 16:20

## 2024-12-15 RX ADMIN — SODIUM CHLORIDE, POTASSIUM CHLORIDE, SODIUM LACTATE AND CALCIUM CHLORIDE: 600; 310; 30; 20 INJECTION, SOLUTION INTRAVENOUS at 18:13

## 2024-12-15 RX ADMIN — KETOROLAC TROMETHAMINE 30 MG: 30 INJECTION, SOLUTION INTRAMUSCULAR at 22:27

## 2024-12-15 RX ADMIN — TERBUTALINE SULFATE 0.25 MG: 1 INJECTION, SOLUTION SUBCUTANEOUS at 10:02

## 2024-12-15 RX ADMIN — CEFAZOLIN 2 G: 330 INJECTION, POWDER, FOR SOLUTION INTRAMUSCULAR; INTRAVENOUS at 15:45

## 2024-12-15 RX ADMIN — ONDANSETRON 4 MG: 2 INJECTION INTRAMUSCULAR; INTRAVENOUS at 22:27

## 2024-12-15 RX ADMIN — EPHEDRINE SULFATE 10 MG: 50 INJECTION INTRAVENOUS at 09:01

## 2024-12-15 RX ADMIN — KETOROLAC TROMETHAMINE 30 MG: 30 INJECTION, SOLUTION INTRAMUSCULAR at 16:20

## 2024-12-15 RX ADMIN — SODIUM CHLORIDE, POTASSIUM CHLORIDE, SODIUM LACTATE AND CALCIUM CHLORIDE: 600; 310; 30; 20 INJECTION, SOLUTION INTRAVENOUS at 11:11

## 2024-12-15 RX ADMIN — AZITHROMYCIN MONOHYDRATE 500 MG: 500 INJECTION, POWDER, LYOPHILIZED, FOR SOLUTION INTRAVENOUS at 16:24

## 2024-12-15 ASSESSMENT — PAIN - FUNCTIONAL ASSESSMENT: PAIN_FUNCTIONAL_ASSESSMENT: ACTIVITIES ARE NOT PREVENTED

## 2024-12-15 ASSESSMENT — PAIN DESCRIPTION - DESCRIPTORS: DESCRIPTORS: ACHING;SORE

## 2024-12-15 ASSESSMENT — PAIN DESCRIPTION - LOCATION: LOCATION: ABDOMEN;INCISION

## 2024-12-15 ASSESSMENT — PAIN SCALES - GENERAL: PAINLEVEL_OUTOF10: 3

## 2024-12-15 ASSESSMENT — PAIN DESCRIPTION - ORIENTATION: ORIENTATION: LOWER

## 2024-12-15 NOTE — OP NOTE
Section Operative Report       Patient: Sudha Barriga MRN: [id number]  SSN: [id number]    YOB: 1985  Age: 39 y.o.  Sex: female       Date of Procedure: 12/15/2024     Preoperative Diagnosis:   Pregnant at 38.1 weeks  Cat 3 fetal heart tracing  Suspected placental abruption    Postoperative Diagnosis:   Pregnant at 38.1 weeks  Cat 3 fetal heart tracing  Partial Placental abruption    Procedure: Primary Low Transverse  SECTION via Pfannenstiel skin incision    Surgeon: Edgard Vásquez MD    Assistant: KAREEM Franz RN SA; [facility]    Anesthesia: Epidural; Dr. Santoyo    Estimated Blood Loss : 600ml     Findings:   Information for the patient's :  VIOLET Barriga [[id number]]   Blood tinged fluid (~400ml); Blood clot ~100ml size); Vertex; Nuchal x1 and True knot x1  Apgars:     Specimens: * No specimens in log *            Complications:  Placental abruption    Procedure Details:    After informed consent was obtained, the patient was administered pre-operative antibiotics and taken to the operating room, where Spinal anesthesia was administered and found to be adequate. A Mckinnon catheter had been placed using sterile technique. The patient was prepped and draped in the usual sterile fashion.  After testing for adequate anesthesia, a Pfannenstiel incision was made with a scalpel and carried down to the anterior rectus fascia with the Bovie. The fascia was incised in the midline and the fascial incision was extended laterally in both directions with the Bovie.  The inferior aspect of the fascial incision was grasped with Kocher clamps, tented up, and the rectus muscles were  from the fascial sheath both bluntly and sharply with Quintana scissors. The superior aspect of the fascial incision in similar fashion was tented up and the rectus muscles were dissected off first bluntly and then sharply with Bovie electrocautery. The rectus muscles were then  in the midline, the

## 2024-12-15 NOTE — PROGRESS NOTES
Called to room,  decels noted on fetal monitor.  Pt recently had epidural placed, had approx 1500 cc IV but some hypotension treated with ephedrine.  Pt able to move well and was placed on hands and knees, uterus seemed to be willam almost continually without a break,  SQ terb given, and heartrate recovered.  Cervix 9/100/0,  arom for scant clear fluid.    Pt not feeling pressure or contractions.  Will allow fetus to recover,  reassess in next 1-2 hours prn  And begin pushing when complete.   Anticipate vaginal delivery

## 2024-12-15 NOTE — PLAN OF CARE
Problem: Vaginal Birth or  Section  Goal: Fetal and maternal status remain reassuring during the birth process  Description:  Birth OB-Pregnancy care plan goal which identifies if the fetal and maternal status remain reassuring during the birth process  Flowsheets (Taken 12/15/2024 050)  Fetal and Maternal Status Remain Reassuring During the Birth Process:   Monitor vital signs   Monitor labor progression (Vaginal delivery)   Monitor fetal heart rate   Monitor uterine activity     Problem: Risk for Maternal Injury  Goal: Remains free from seizures and injury related to seizure activity  Description: INTERVENTIONS:.  -Maintain airway, patient safety and administer oxygen as ordered  -Monitor patient for seizure activity, document and report duration and descrition  -If Seizure occurs, turn patient to dide and suction secretions as needed  -Reorient patient post seizure  -Seizure Pads  -Instruct patient/family to notify RN of any seizure activity  -Instruct patient/family to call for assistance with activity based on assessment  Flowsheets (Taken 12/15/2024 050)  Remains free from seizures and injury related to seizure activity:   If seizure occurs, turn patient to side and suction secretions as needed   Maintain airway, patient safety  and administer oxygen as ordered   Instruct patient/family to call for assistance with activity based on assessment   Instruct patient/family to notify RN of any seizure activity   Monitor patient for seizure activity, document and report duration and description of seizure to Licensed Independent Practitioner     Problem: Risk for Decreased Cardiac Output  Goal: Maintains optimal cardiac output and hemodynamic stability  Description: INTERVENTIONS:.  -Monitor blood pressure and heart rate  -Monitor urine output and notify licensed practitioner for values outside of   -Assess for signes of decreased cardiac output  -Administer fluid and /or volume expanders as

## 2024-12-15 NOTE — PROGRESS NOTES
0735 Bedside and Verbal shift change report received from EDWIN Robbins RN. Report included the following information Nurse Handoff Report, Intake/Output, MAR, and Recent Results.    0740 Patient appears very uncomfortable with contractions, desires epidural  0744 IV saline flushed  0745 Lactated ringers started at 125 ml hour  0747 IVF bolus started  0815 Dr. Santoyo notified patient desires epidural  0820 Patient repositioned to sitting for epidural  0830 Dr. Santoyo at bedside, intra procedual time out done  0831 Epidural started  0836 Epidural placed  0840 Repositioned to left sdie  0859 Comfortable with contractions, Repositioned to right side BP 89/51, Labetalol held  0901 Ephedrine 10 mg IV given  0950 FHR prolonged deceleration, repositioned to left side, LUBA Frank CNM notified by PRINCE Lindsay RNC, requested bedside evaluation  0954 LUBA Frank CNM at bedside, FHR tracing reviewed  0955 SCE 9/100/0, appears to have SROM scant amount clear fluid noted on perineum, attempt to AROM forebag no additional fluid noted  0956 IVF bolus started per SRINATH Frank CNM, repositioned to knee chest  1002 Terbutaline 0.25 mg SQ given by PRINCE Lindsay RNC.   1008 Repositoined to left side  1010 Straight cath for 200 ml clear yellow urine  1043 Repositioned to right side  1130 Slight intermittent vaginal pressure, will monitor  1250 LUBA Frank CNM notified of UC pattern, orders obtained for oxytocin  1258 Repositioned to left side  1302 Oxytocin started at 1 mu/min  1310 Straight cath for 150 ml clear emerald urine.  1445 SVE 10/100/+2  1455 LUBA Frank CNM notified of SVE findings, FHR decel, requested for bedside evaluation  1457 Prolonged FHR deceleration, oxytocin off. LUBA Frank CNM at bedside  1500 Begins pushing, left tilt, caput visible  1503 O2 at 10 liters via facemask  1505 To semifowlers to push, caput visible moderate amount of vaginal bleeding noted with small clot  1515 Dr. Vásquez requested for bedside

## 2024-12-15 NOTE — PROGRESS NOTES
LATE ENTRY AFTER THE  DELIVERY    I was called to the room to evaluate for a potential VAVD for a patient who was pushing with deep variable decels to 60s with slow recovery. I pushed with the patient for a few CTXs after placing FSE to assess BTB variability. The patient was unable to expeditiously deliver and the fetal caput would not allow for a safe application of the vacuum device so we called an emergent  section and transferred her to the OR. This was explained to the patient and her family in the LDR.

## 2024-12-15 NOTE — PROGRESS NOTES
1935 Bedside and Verbal shift change report received from D. Seaver, RN. Report included the following information Nurse Handoff Report, Intake/Output, MAR, and Recent Results. Plan of care reviewed with patient. Pt is accompanied by her mother.     2304 50mcg of buccal miso    2350 Pt called out to the nurses station with concerns of bleeding and a blood clot . MONSERRAT Webb RN  at bedside. EDWIN Robbins RN followed after. Asked pt to alert us if bleeding continues or picks up to alert RN.     0410 Pt asking for pain meds. CE 4-5cm, 70% -2. Will call LUBA Frank CNM to update on patients cervical change and inquire about pain medication for patient.     0427 IV and IM 1mg Dilaudid administered. Educated pt on fall precautions. Bed is low, call bell easily accessible to pt.   3690 Bedside and Verbal shift change report given to D. Seaver, RN. Report included the following information Nurse Handoff Report, Intake/Output, MAR, and Recent Results.

## 2024-12-16 LAB
ALBUMIN SERPL-MCNC: 2 G/DL (ref 3.5–5)
ALBUMIN/GLOB SERPL: 0.6 (ref 1.1–2.2)
ALP SERPL-CCNC: 102 U/L (ref 45–117)
ALT SERPL-CCNC: 12 U/L (ref 12–78)
ANION GAP SERPL CALC-SCNC: 3 MMOL/L (ref 2–12)
AST SERPL-CCNC: 23 U/L (ref 15–37)
BASE DEFICIT BLD-SCNC: 3.6 MMOL/L
BDY SITE: ABNORMAL
BILIRUB SERPL-MCNC: 0.3 MG/DL (ref 0.2–1)
BODY TEMPERATURE: 31.7
BUN SERPL-MCNC: 8 MG/DL (ref 6–20)
BUN/CREAT SERPL: 14 (ref 12–20)
CALCIUM SERPL-MCNC: 7.6 MG/DL (ref 8.5–10.1)
CHLORIDE SERPL-SCNC: 108 MMOL/L (ref 97–108)
CO2 SERPL-SCNC: 27 MMOL/L (ref 21–32)
CREAT SERPL-MCNC: 0.57 MG/DL (ref 0.55–1.02)
ERYTHROCYTE [DISTWIDTH] IN BLOOD BY AUTOMATED COUNT: 13 % (ref 11.5–14.5)
GAS FLOW.O2 O2 DELIVERY SYS: ABNORMAL
GLOBULIN SER CALC-MCNC: 3.2 G/DL (ref 2–4)
GLUCOSE SERPL-MCNC: 112 MG/DL (ref 65–100)
HCO3 BLD-SCNC: 26.8 MMOL/L (ref 21–28)
HCT VFR BLD AUTO: 26 % (ref 35–47)
HGB BLD-MCNC: 8.6 G/DL (ref 11.5–16)
MCH RBC QN AUTO: 30.6 PG (ref 26–34)
MCHC RBC AUTO-ENTMCNC: 33.1 G/DL (ref 30–36.5)
MCV RBC AUTO: 92.5 FL (ref 80–99)
NRBC # BLD: 0 K/UL (ref 0–0.01)
NRBC BLD-RTO: 0 PER 100 WBC
PCO2 BLD: 58 MMHG (ref 35–48)
PH BLD: 7.24 (ref 7.35–7.45)
PLATELET # BLD AUTO: 230 K/UL (ref 150–400)
PMV BLD AUTO: 10.3 FL (ref 8.9–12.9)
PO2 BLD: 31 MMHG (ref 83–108)
POTASSIUM SERPL-SCNC: 4.1 MMOL/L (ref 3.5–5.1)
PROT SERPL-MCNC: 5.2 G/DL (ref 6.4–8.2)
RBC # BLD AUTO: 2.81 M/UL (ref 3.8–5.2)
SAO2 % BLD: 66.6 % (ref 92–97)
SERVICE CMNT-IMP: ABNORMAL
SODIUM SERPL-SCNC: 138 MMOL/L (ref 136–145)
SPECIMEN TYPE: ABNORMAL
WBC # BLD AUTO: 16.1 K/UL (ref 3.6–11)

## 2024-12-16 PROCEDURE — 1120000000 HC RM PRIVATE OB

## 2024-12-16 PROCEDURE — 2580000003 HC RX 258: Performed by: ADVANCED PRACTICE MIDWIFE

## 2024-12-16 PROCEDURE — 85027 COMPLETE CBC AUTOMATED: CPT

## 2024-12-16 PROCEDURE — 7210000100 HC LABOR FEE PER 1 HR

## 2024-12-16 PROCEDURE — 7100000001 HC PACU RECOVERY - ADDTL 15 MIN

## 2024-12-16 PROCEDURE — 80053 COMPREHEN METABOLIC PANEL: CPT

## 2024-12-16 PROCEDURE — 6360000002 HC RX W HCPCS: Performed by: OBSTETRICS & GYNECOLOGY

## 2024-12-16 PROCEDURE — 6370000000 HC RX 637 (ALT 250 FOR IP): Performed by: OBSTETRICS & GYNECOLOGY

## 2024-12-16 PROCEDURE — 2580000003 HC RX 258: Performed by: OBSTETRICS & GYNECOLOGY

## 2024-12-16 PROCEDURE — 36415 COLL VENOUS BLD VENIPUNCTURE: CPT

## 2024-12-16 PROCEDURE — 82803 BLOOD GASES ANY COMBINATION: CPT

## 2024-12-16 PROCEDURE — 7100000000 HC PACU RECOVERY - FIRST 15 MIN

## 2024-12-16 RX ORDER — SODIUM CHLORIDE, SODIUM LACTATE, POTASSIUM CHLORIDE, CALCIUM CHLORIDE 600; 310; 30; 20 MG/100ML; MG/100ML; MG/100ML; MG/100ML
INJECTION, SOLUTION INTRAVENOUS ONCE
Status: COMPLETED | OUTPATIENT
Start: 2024-12-16 | End: 2024-12-16

## 2024-12-16 RX ORDER — SODIUM CHLORIDE, SODIUM LACTATE, POTASSIUM CHLORIDE, AND CALCIUM CHLORIDE .6; .31; .03; .02 G/100ML; G/100ML; G/100ML; G/100ML
500 INJECTION, SOLUTION INTRAVENOUS ONCE
Status: COMPLETED | OUTPATIENT
Start: 2024-12-16 | End: 2024-12-16

## 2024-12-16 RX ADMIN — SODIUM CHLORIDE, POTASSIUM CHLORIDE, SODIUM LACTATE AND CALCIUM CHLORIDE: 600; 310; 30; 20 INJECTION, SOLUTION INTRAVENOUS at 08:54

## 2024-12-16 RX ADMIN — DOCUSATE SODIUM 100 MG: 100 CAPSULE, LIQUID FILLED ORAL at 21:30

## 2024-12-16 RX ADMIN — ACETAMINOPHEN 1000 MG: 500 TABLET ORAL at 06:26

## 2024-12-16 RX ADMIN — SODIUM CHLORIDE, POTASSIUM CHLORIDE, SODIUM LACTATE AND CALCIUM CHLORIDE 500 ML: 600; 310; 30; 20 INJECTION, SOLUTION INTRAVENOUS at 05:12

## 2024-12-16 RX ADMIN — KETOROLAC TROMETHAMINE 30 MG: 30 INJECTION, SOLUTION INTRAMUSCULAR at 10:48

## 2024-12-16 RX ADMIN — ACETAMINOPHEN 1000 MG: 500 TABLET ORAL at 15:09

## 2024-12-16 RX ADMIN — ACETAMINOPHEN 1000 MG: 500 TABLET ORAL at 23:04

## 2024-12-16 RX ADMIN — ESCITALOPRAM OXALATE 15 MG: 10 TABLET ORAL at 21:31

## 2024-12-16 RX ADMIN — KETOROLAC TROMETHAMINE 30 MG: 30 INJECTION, SOLUTION INTRAMUSCULAR at 17:49

## 2024-12-16 RX ADMIN — KETOROLAC TROMETHAMINE 30 MG: 30 INJECTION, SOLUTION INTRAMUSCULAR at 04:28

## 2024-12-16 RX ADMIN — CEFAZOLIN 2000 MG: 1 INJECTION, POWDER, FOR SOLUTION INTRAMUSCULAR; INTRAVENOUS at 00:45

## 2024-12-16 RX ADMIN — NIFEDIPINE 30 MG: 30 TABLET, FILM COATED, EXTENDED RELEASE ORAL at 21:32

## 2024-12-16 ASSESSMENT — PAIN SCALES - GENERAL
PAINLEVEL_OUTOF10: 3
PAINLEVEL_OUTOF10: 2
PAINLEVEL_OUTOF10: 2

## 2024-12-16 ASSESSMENT — PAIN DESCRIPTION - ORIENTATION
ORIENTATION: LOWER

## 2024-12-16 ASSESSMENT — PAIN DESCRIPTION - DESCRIPTORS
DESCRIPTORS: ACHING;CRAMPING;SORE
DESCRIPTORS: SORE
DESCRIPTORS: ACHING

## 2024-12-16 ASSESSMENT — PAIN DESCRIPTION - LOCATION
LOCATION: INCISION
LOCATION: ABDOMEN;INCISION
LOCATION: ABDOMEN

## 2024-12-16 ASSESSMENT — PAIN - FUNCTIONAL ASSESSMENT
PAIN_FUNCTIONAL_ASSESSMENT: ACTIVITIES ARE NOT PREVENTED

## 2024-12-16 NOTE — LACTATION NOTE
Mom continues to pump to stimulate milk production for infant in the NICU. At the time of my visit, she is pumping one breast at a time. Suggested she pump both at the same time to maximize stimulation. Demonstrated breast massage and hand expression.

## 2024-12-16 NOTE — ADT AUTH CERT
Progress Notes by Mellissa Teague MD at 2024 10:01 PM    Author: Mellissa Teague MD Service: Obstetrics & Gynecology Author Type: Physician   Filed: 2024 10:03 PM Date of Service: 2024 10:01 PM Status: Signed   : Mellissa Teague MD (Physician)   In to meet pt shortly after 2100.  She is comfortable with cytotec x 1, reports feeling some menstrual-like cramps only.  She is wondering if she could get the next dose of cytotec placed vaginally.  FHT CAT I tracing  TOCO q 2-3 minutes  Cx deferred     Will plan next dose of cytotec vaginally once contractions space out more.      Electronically signed by Mellissa Teague MD at 2024 10:03 PM     Progress Notes by Edgard Vásquez MD at 12/15/2024  5:00 PM    Author: Edgard Vásquez MD Service: -- Author Type: Physician   Filed: 12/15/2024  5:03 PM Date of Service: 12/15/2024  5:00 PM Status: Signed   : Edgard Vásquez MD (Physician)   LATE ENTRY AFTER THE  DELIVERY     I was called to the room to evaluate for a potential VAVD for a patient who was pushing with deep variable decels to 60s with slow recovery. I pushed with the patient for a few CTXs after placing FSE to assess BTB variability. The patient was unable to expeditiously deliver and the fetal caput would not allow for a safe application of the vacuum device so we called an emergent  section and transferred her to the OR. This was explained to the patient and her family in the LDR.         Electronically signed by Edgard Vásquez MD at 12/15/2024  5:03 PM     Progress Notes by Sarita Ivey DO at 2024  8:33 AM    Author: Sarita Ivey DO Service: Obstetrics & Gynecology Author Type: Physician   Filed: 2024  8:48 AM Date of Service: 2024  8:33 AM Status: Signed   : Sarita Ivey DO (Physician)   Expand All Collapse All  Post-Operative  Day 1     Patient doing well without unusual

## 2024-12-16 NOTE — PROGRESS NOTES
Post-Operative  Day 1    Patient doing well without unusual complaints. Tolerating liquids, no flatus. Attempted crackers last night and had some N&V. Mckinnon catheter in place.     Baby boy in NICU for cooling currently.     Vitals:  /77   Pulse 91   Temp 97.6 °F (36.4 °C) (Oral)   Resp 16   SpO2 94%   Breastfeeding Unknown   Temp (24hrs), Av °F (36.7 °C), Min:97.6 °F (36.4 °C), Max:98.9 °F (37.2 °C)      Last 24hr Input/Output:    Intake/Output Summary (Last 24 hours) at 2024 0833  Last data filed at 2024 0629  Gross per 24 hour   Intake 250 ml   Output 2119 ml   Net -1869 ml          Exam:       Patient without distress  Abdomen:+ bowel sounds, soft, expected tenderness, fundus firm, wound dressing intact     Lower extremities are nontender without edema. No cords    Labs:   Lab Results   Component Value Date/Time    WBC 16.1 2024 04:35 AM    WBC 7.7 2024 12:02 PM    WBC 10.3 2024 12:40 PM    WBC 9.3 2024 04:45 PM    HGB 8.6 2024 04:35 AM    HGB 10.6 2024 12:02 PM    HGB 10.7 2024 12:40 PM    HGB 10.2 2024 04:45 PM    HCT 26.0 2024 04:35 AM    HCT 31.9 2024 12:02 PM    HCT 31.7 2024 12:40 PM    HCT 30.0 2024 04:45 PM     2024 04:35 AM     2024 12:02 PM     2024 12:40 PM     2024 04:45 PM       Recent Results (from the past 24 hour(s))   CBC    Collection Time: 24  4:35 AM   Result Value Ref Range    WBC 16.1 (H) 3.6 - 11.0 K/uL    RBC 2.81 (L) 3.80 - 5.20 M/uL    Hemoglobin 8.6 (L) 11.5 - 16.0 g/dL    Hematocrit 26.0 (L) 35.0 - 47.0 %    MCV 92.5 80.0 - 99.0 FL    MCH 30.6 26.0 - 34.0 PG    MCHC 33.1 30.0 - 36.5 g/dL    RDW 13.0 11.5 - 14.5 %    Platelets 230 150 - 400 K/uL    MPV 10.3 8.9 - 12.9 FL    Nucleated RBCs 0.0 0  WBC    nRBC 0.00 0.00 - 0.01 K/uL   Arterial Blood Gas, POC    Collection Time: 24  4:47 AM   Result Value Ref Range

## 2024-12-16 NOTE — PROGRESS NOTES
0845: Per Dr Ivey, discontinue woodard despite low urine output, obtain CMP, and initiate 1000 ml LR bolus now. Placed hat in toilet to monitor output.  1035: Helped pt express drops of colostrum after pumping for infant in the NICU.  1100: Assisted patient via wheelchair to NICU to see infant \"Chemo\".

## 2024-12-16 NOTE — PROGRESS NOTES
1930 Verbal and BSR received from D.Seaver RN.  Report included the following information Nurse Handoff Report, Intake/Output, MAR, and Recent Results. Plan of care discussed with pt. Fundal check. Firm, midline at U with small amount of bleeding.  Patient is turning self, moving legs and is able to lift hips off the bed. Pt would like to go to NICU to see her baby. NICU called and they asked pt to wait 30-45mins    2125 This RN called report. TRANSFER - OUT REPORT:    Verbal report given to MATHEW Rudolph RN  on Sudha Barriga  being transferred to MIU for routine progression of patient care       Report consisted of patient's Situation, Background, Assessment and   Recommendations(SBAR).     Information from the following report(s) Nurse Handoff Report, Adult Overview, Surgery Report, Intake/Output, MAR, Recent Results, Med Rec Status, Quality Measures, Neuro Assessment, and Event Log was reviewed with the receiving nurse.           Lines:   Peripheral IV 12/13/24 Right Wrist (Active)   Site Assessment Clean, dry & intact 12/15/24 0744   Line Status Flushed 12/15/24 0744   Line Care Connections checked and tightened 12/15/24 0744   Phlebitis Assessment No symptoms 12/15/24 0744   Infiltration Assessment 0 12/15/24 0744   Alcohol Cap Used Yes 12/15/24 0744   Dressing Status Clean, dry & intact 12/15/24 0744   Dressing Type Transparent 12/15/24 0744        Opportunity for questions and clarification was provided.      Patient transported with:  Registered Nurse

## 2024-12-17 PROCEDURE — 1120000000 HC RM PRIVATE OB

## 2024-12-17 PROCEDURE — 6370000000 HC RX 637 (ALT 250 FOR IP): Performed by: OBSTETRICS & GYNECOLOGY

## 2024-12-17 RX ADMIN — IBUPROFEN 800 MG: 400 TABLET, FILM COATED ORAL at 14:28

## 2024-12-17 RX ADMIN — ACETAMINOPHEN 1000 MG: 500 TABLET ORAL at 10:12

## 2024-12-17 RX ADMIN — ESCITALOPRAM OXALATE 15 MG: 10 TABLET ORAL at 21:04

## 2024-12-17 RX ADMIN — ACETAMINOPHEN 1000 MG: 500 TABLET ORAL at 17:40

## 2024-12-17 RX ADMIN — IBUPROFEN 800 MG: 400 TABLET, FILM COATED ORAL at 05:57

## 2024-12-17 RX ADMIN — DOCUSATE SODIUM 100 MG: 100 CAPSULE, LIQUID FILLED ORAL at 21:04

## 2024-12-17 RX ADMIN — Medication 1 TABLET: at 10:13

## 2024-12-17 RX ADMIN — IBUPROFEN 800 MG: 400 TABLET, FILM COATED ORAL at 22:31

## 2024-12-17 RX ADMIN — FERROUS SULFATE TAB 325 MG (65 MG ELEMENTAL FE) 325 MG: 325 (65 FE) TAB at 10:13

## 2024-12-17 RX ADMIN — NIFEDIPINE 30 MG: 30 TABLET, FILM COATED, EXTENDED RELEASE ORAL at 21:04

## 2024-12-17 RX ADMIN — DOCUSATE SODIUM 100 MG: 100 CAPSULE, LIQUID FILLED ORAL at 10:13

## 2024-12-17 ASSESSMENT — PAIN SCALES - GENERAL
PAINLEVEL_OUTOF10: 2
PAINLEVEL_OUTOF10: 1

## 2024-12-17 ASSESSMENT — PAIN DESCRIPTION - DESCRIPTORS
DESCRIPTORS: SORE
DESCRIPTORS: CRAMPING

## 2024-12-17 ASSESSMENT — PAIN - FUNCTIONAL ASSESSMENT
PAIN_FUNCTIONAL_ASSESSMENT: ACTIVITIES ARE NOT PREVENTED
PAIN_FUNCTIONAL_ASSESSMENT: ACTIVITIES ARE NOT PREVENTED

## 2024-12-17 ASSESSMENT — PAIN DESCRIPTION - LOCATION
LOCATION: ABDOMEN
LOCATION: INCISION

## 2024-12-17 ASSESSMENT — PAIN DESCRIPTION - ORIENTATION: ORIENTATION: LOWER

## 2024-12-17 NOTE — PROGRESS NOTES
Post-Operative  Day 2    Sudha Barriga     Information for the patient's :  VIOLET Barriga [[id number]]   , Low Transverse Patient doing well without unusual complaints. Passing flatus, voiding and ambulating without difficulty. Tolerating regular diet. Going to the NICU, baby still on cooling.     Vitals:  /84   Pulse 93   Temp 97.4 °F (36.3 °C) (Oral)   Resp 16   SpO2 96%   Breastfeeding Unknown   Temp (24hrs), Av.8 °F (36.6 °C), Min:97.4 °F (36.3 °C), Max:98.3 °F (36.8 °C)        Exam:      Patient without distress.               Abdomen: soft, expected tenderness, fundus firm                Wound incision clean, dry and intact. Dermabond in place.                Lower extremities are nontender without edema.  No cords    Labs:   Lab Results   Component Value Date/Time    WBC 16.1 2024 04:35 AM    WBC 7.7 2024 12:02 PM    WBC 10.3 2024 12:40 PM    WBC 9.3 2024 04:45 PM    HGB 8.6 2024 04:35 AM    HGB 10.6 2024 12:02 PM    HGB 10.7 2024 12:40 PM    HGB 10.2 2024 04:45 PM    HCT 26.0 2024 04:35 AM    HCT 31.9 2024 12:02 PM    HCT 31.7 2024 12:40 PM    HCT 30.0 2024 04:45 PM     2024 04:35 AM     2024 12:02 PM     2024 12:40 PM     2024 04:45 PM       Recent Results (from the past 24 hour(s))   Comprehensive Metabolic Panel    Collection Time: 24  8:53 AM   Result Value Ref Range    Sodium 138 136 - 145 mmol/L    Potassium 4.1 3.5 - 5.1 mmol/L    Chloride 108 97 - 108 mmol/L    CO2 27 21 - 32 mmol/L    Anion Gap 3 2 - 12 mmol/L    Glucose 112 (H) 65 - 100 mg/dL    BUN 8 6 - 20 MG/DL    Creatinine 0.57 0.55 - 1.02 MG/DL    BUN/Creatinine Ratio 14 12 - 20      Est, Glom Filt Rate >90 >60 ml/min/1.73m2    Calcium 7.6 (L) 8.5 - 10.1 MG/DL    Total Bilirubin 0.3 0.2 - 1.0 MG/DL    ALT 12 12 - 78 U/L    AST 23 15 - 37 U/L    Alk Phosphatase 102 45 - 117 U/L

## 2024-12-17 NOTE — CARE COORDINATION
Care Management Initial Assessment       RUR: 9%--low  Readmission? Yes - 12/8-12/11  1st IM letter given? No  1st  letter given: No    Emergency contact: Ryley Barriga, spouse, [phone]    Patient re-admitted for induction of labor. Recent admission for potential induction due to chronic hypertension. Plan was for vaginal delivery, but emergency c-section called. Baby taken to NICU for cooling. CM will follow up with patient to complete NICU assessment.      12/17/24 1016   Service Assessment   Patient Orientation Alert and Oriented   Cognition Alert   History Provided By Medical Record   Primary Caregiver Self   Support Systems Spouse/Significant Other   PCP Verified by CM Yes   Last Visit to PCP Within last two years   Prior Functional Level Independent in ADLs/IADLs   Current Functional Level Independent in ADLs/IADLs   Can patient return to prior living arrangement Yes   Ability to make needs known: Good   Family able to assist with home care needs: Yes   Financial Resources None   Community Resources None   Social/Functional History   Lives With Spouse   Type of Home House   Prior Level of Assist for ADLs Independent   Prior Level of Assist for Homemaking Independent   Homemaking Responsibilities Yes   Ambulation Assistance Independent   Prior Level of Assist for Transfers Independent   Active  Yes   Discharge Planning   Type of Residence House   Living Arrangements Spouse/Significant Other   Potential Assistance Needed N/A   DME Ordered? No   Potential Assistance Purchasing Medications No   Type of Home Care Services None   Patient expects to be discharged to: House   Services At/After Discharge   Transition of Care Consult (CM Consult) N/A   Services At/After Discharge None   Mode of Transport at Discharge Other (see comment)  (in car w/spouse)   Confirm Follow Up Transport Family   Condition of Participation: Discharge Planning   The Plan for Transition of Care is related to the following  treatment goals: home w/family assistance     Ada Lin [facility] Management [facility]  [phone]

## 2024-12-17 NOTE — CARE COORDINATION
Transition of Care Plan:    RUR: 9%--low  Prior Level of Functioning: independent  Disposition: home w/family assistance  Follow up appointments: OB/GYN  DME needed: N/A  Transportation at discharge: in car w/spouse  Caregiver Contact: Ryley Barriga, spouse, [phone]  Discharge Caregiver contacted prior to discharge? N/A  Care Conference needed? no  Barriers to discharge:  none noted       12/17/24 1025   Readmission Assessment   Number of Days since last admission? 1-7 days   Previous Disposition Home with Family   Who is being Interviewed Patient  (medical record)   What was the patient's/caregiver's perception as to why they think they needed to return back to the hospital? Other (Comment)  (pregnancy)   Did you visit your Primary Care Physician after you left the hospital, before you returned this time? No   Why weren't you able to visit your PCP? Did not have an appointment   Did you see a specialist, such as Cardiac, Pulmonary, Orthopedic Physician, etc. after you left the hospital? Yes   Who advised the patient to return to the hospital? Physician   Does the patient report anything that got in the way of taking their medications? No   In our efforts to provide the best possible care to you and others like you, can you think of anything that we could have done to help you after you left the hospital the first time, so that you might not have needed to return so soon? Other (Comment)  (planned induction of labor)     Ada Lin LMSW  Care Management [facility]  [phone]

## 2024-12-17 NOTE — LACTATION NOTE
Patient continues to pump for baby in the NICU. She knows to label the milk with the date and time before taking to the NICU. I helped mom with hand expression and we were able express drops of colostrum from each breast. She will continue to pump at least every 3 hours and follow up with hand expression.

## 2024-12-18 PROCEDURE — 1120000000 HC RM PRIVATE OB

## 2024-12-18 PROCEDURE — 6370000000 HC RX 637 (ALT 250 FOR IP): Performed by: OBSTETRICS & GYNECOLOGY

## 2024-12-18 RX ORDER — LABETALOL 200 MG/1
100 TABLET, FILM COATED ORAL EVERY 12 HOURS SCHEDULED
Status: DISCONTINUED | OUTPATIENT
Start: 2024-12-18 | End: 2024-12-19 | Stop reason: HOSPADM

## 2024-12-18 RX ADMIN — ACETAMINOPHEN 1000 MG: 500 TABLET ORAL at 18:44

## 2024-12-18 RX ADMIN — ESCITALOPRAM OXALATE 15 MG: 10 TABLET ORAL at 21:11

## 2024-12-18 RX ADMIN — DOCUSATE SODIUM 100 MG: 100 CAPSULE, LIQUID FILLED ORAL at 09:07

## 2024-12-18 RX ADMIN — ACETAMINOPHEN 1000 MG: 500 TABLET ORAL at 01:25

## 2024-12-18 RX ADMIN — DOCUSATE SODIUM 100 MG: 100 CAPSULE, LIQUID FILLED ORAL at 21:11

## 2024-12-18 RX ADMIN — IBUPROFEN 800 MG: 400 TABLET, FILM COATED ORAL at 22:41

## 2024-12-18 RX ADMIN — ACETAMINOPHEN 1000 MG: 500 TABLET ORAL at 09:07

## 2024-12-18 RX ADMIN — LABETALOL HYDROCHLORIDE 100 MG: 200 TABLET, FILM COATED ORAL at 21:11

## 2024-12-18 RX ADMIN — IBUPROFEN 800 MG: 400 TABLET, FILM COATED ORAL at 07:07

## 2024-12-18 RX ADMIN — Medication 1 TABLET: at 09:07

## 2024-12-18 RX ADMIN — IBUPROFEN 800 MG: 400 TABLET, FILM COATED ORAL at 14:53

## 2024-12-18 RX ADMIN — NIFEDIPINE 30 MG: 30 TABLET, FILM COATED, EXTENDED RELEASE ORAL at 21:11

## 2024-12-18 RX ADMIN — FERROUS SULFATE TAB 325 MG (65 MG ELEMENTAL FE) 325 MG: 325 (65 FE) TAB at 09:07

## 2024-12-18 RX ADMIN — LABETALOL HYDROCHLORIDE 100 MG: 200 TABLET, FILM COATED ORAL at 09:07

## 2024-12-18 ASSESSMENT — PAIN SCALES - GENERAL
PAINLEVEL_OUTOF10: 2
PAINLEVEL_OUTOF10: 2
PAINLEVEL_OUTOF10: 3

## 2024-12-18 ASSESSMENT — PAIN - FUNCTIONAL ASSESSMENT
PAIN_FUNCTIONAL_ASSESSMENT: ACTIVITIES ARE NOT PREVENTED

## 2024-12-18 ASSESSMENT — PAIN DESCRIPTION - DESCRIPTORS
DESCRIPTORS: CRAMPING;SORE
DESCRIPTORS: CRAMPING;SORE
DESCRIPTORS: TIGHTNESS

## 2024-12-18 ASSESSMENT — PAIN DESCRIPTION - ORIENTATION
ORIENTATION: LOWER

## 2024-12-18 ASSESSMENT — PAIN DESCRIPTION - LOCATION
LOCATION: INCISION
LOCATION: ABDOMEN;INCISION
LOCATION: ABDOMEN;INCISION

## 2024-12-18 NOTE — LACTATION NOTE
Patient states she has been pumping and following up with hand expression. She is able to express colostrum with the hand expression. She knows to label the milk with the date and time before taking to the NICU. She will continue to pump at least every 3 hours.

## 2024-12-18 NOTE — DISCHARGE INSTRUCTIONS
Take Home Medications   See medication list  Continue taking your prenatal vitamins if you are breastfeeding.    Follow-up Appointment:  Follow-up with Ob/Gyn in 2 & 6 weeks    Follow-up care is a key part of your treatment and safety. Be sure to make and go to all appointments, and call your doctor if you are having problems. It’s also a good idea to know your test results and keep a list of the medicines you take.    Activity  Avoid anything in your vagina for 6 weeks (no intercourse, tampons, or douching).  You may drive unless you are taking prescription pain medications.  Climbing stairs and light lifting are ok after a vaginal delivery unless your doctor tells you not to.  You may gradually work up to exercise over the next few weeks, but take it easy- you'll be tired!    Diet  You may eat a regular diet but you may want to avoid heavy, greasy foods and other foods that could increase constipation.    Wound care  If you have stitches, continue to rinse with a squirt bottle of warm water each time you use the bathroom for about 2 weeks.  Your stitches will gradually dissolve over several weeks.  Sitz baths are also helpful to keep the wound clean, encourage healing, and to help with pain associated with the stitches or hemorrhoids.  You can use either a sitz bath basin or a bathtub filled with 2-3\" inches of plain warm water.  Soak for 10 minutes 3 times a day.    Pain Management  You can take acetominophen and ibuprofen together. You will get the most relief if you take the maximum dose:  Tylenol or acetominophen 1000 mg every 8 hours (equivalent to 2 Extra Strength Tylenols every 8 hours) (can do every 6hrs if needed with max of 4000mg in 24 hours)  Motrin or Advil (generic ibuprofen) 800 mg every 8 hours (4 tablets or capsules every 8 hours)  If you were given a prescription pain medication, you can take ibuprofen along with it (doses as above), but not Tylenol.  Use ibuprofen as the main medication, and

## 2024-12-18 NOTE — PROGRESS NOTES
Post-Operative  Day 3  Patient doing well without unusual complaints. Passing flatus, voiding and ambulating without difficulty. Tolerating regular diet. Has not had BM yet. Baby still on cooling and required some sedation overnight for comfort. They plan for MRI once rewarming begins, anticipating tonight or tomorrow.     Denies HA, visual changes, SOB, or mid epigastric pain    Vitals:  /87   Pulse 81   Temp 97.8 °F (36.6 °C) (Oral)   Resp 16   SpO2 95%   Breastfeeding Unknown   Temp (24hrs), Av.4 °F (36.9 °C), Min:97.8 °F (36.6 °C), Max:99.2 °F (37.3 °C)        Exam:      Patient without distress.               Abdomen: soft, expected tenderness, fundus firm                Wound incision clean, dry and intact. Dermabond in place.                Lower extremities are nontender without edema.  No cords    Labs:   Lab Results   Component Value Date/Time    WBC 16.1 2024 04:35 AM    WBC 7.7 2024 12:02 PM    WBC 10.3 2024 12:40 PM    WBC 9.3 2024 04:45 PM    HGB 8.6 2024 04:35 AM    HGB 10.6 2024 12:02 PM    HGB 10.7 2024 12:40 PM    HGB 10.2 2024 04:45 PM    HCT 26.0 2024 04:35 AM    HCT 31.9 2024 12:02 PM    HCT 31.7 2024 12:40 PM    HCT 30.0 2024 04:45 PM     2024 04:35 AM     2024 12:02 PM     2024 12:40 PM     2024 04:45 PM       No results found for this or any previous visit (from the past 24 hour(s)).        Assessment/Plan: Ms. Sudha Barriga is a 39 y.o.  who is Post-Op day 3 s/p emergent LTCS 2/2 cat 3 tracing while pushing at 38w1d following IOL 2/2 CHTN & IVF pregnancy. Delivery notable for suspected placental abruption and true knot. Baby boy in NICU on cooling     Postoperative care:   - continue routine post op care  - Baby boy, in NICU  - Rh pos/ rubella immune     Acute Blood Loss Anemia (FEO486)  - Hgb 8.6 > asymptomatic   - PO iron      CHTN  -

## 2024-12-18 NOTE — PROGRESS NOTES
Spiritual Health History and Assessment/Progress Note  [facility]    Initial Encounter,  , Life Adjustments,      Name: Sudha Barriga MRN: [id number]    Age: 39 y.o.     Sex: female   Language: English   Faith: Other   Encounter for induction of labor     Date: 12/18/2024            Total Time Calculated: 20 min              Spiritual Assessment began in [facility]        Referral/Consult From: Rounding   Encounter Overview/Reason: Initial Encounter  Service Provided For: Patient, Family- Patient and her spouse.     Lo, Belief, Meaning:   Patient is connected with a lo tradition or spiritual practice and has beliefs or practices that help with coping during difficult times  Family/Friends identify as spiritual      Importance and Influence:  Patient has spiritual/personal beliefs that influence decisions regarding their health  Family/Friends have spiritual/personal beliefs that influence decisions regarding the patient's health    Community:  Patient feels well-supported. Support system includes: Spouse/Partner and Extended family  Family/Friends feel well-supported. Support system includes: Spouse/Partner and Extended family    Assessment and Plan of Care:     Patient Interventions include: Facilitated expression of thoughts and feelings and Affirmed coping skills/support systems, provided supportive conversation, encouraged self care. Provided assurance of prayer for their baby, Chemo, and each of them.   Family/Friends Interventions include: Facilitated expression of thoughts and feelings and Affirmed coping skills/support systems, spouse expressed he is still processing all the  events around birth of their son, Chemo, who is in NICU, been under a cooling protocol.     Patient Plan of Care: Spiritual Care available upon further referral  Family/Friends Plan of Care: Spiritual Care available upon further referral    Electronically signed by TIMOTHY Farias on 12/18/2024 at 11:01 AM

## 2024-12-19 VITALS
HEART RATE: 79 BPM | OXYGEN SATURATION: 98 % | RESPIRATION RATE: 16 BRPM | SYSTOLIC BLOOD PRESSURE: 132 MMHG | DIASTOLIC BLOOD PRESSURE: 90 MMHG | TEMPERATURE: 98.5 F

## 2024-12-19 PROCEDURE — 6370000000 HC RX 637 (ALT 250 FOR IP): Performed by: OBSTETRICS & GYNECOLOGY

## 2024-12-19 RX ORDER — ACETAMINOPHEN 500 MG
1000 TABLET ORAL EVERY 8 HOURS SCHEDULED
Qty: 120 TABLET | Refills: 3 | Status: SHIPPED | OUTPATIENT
Start: 2024-12-19

## 2024-12-19 RX ORDER — IBUPROFEN 800 MG/1
800 TABLET, FILM COATED ORAL EVERY 8 HOURS
Qty: 120 TABLET | Refills: 3 | Status: SHIPPED | OUTPATIENT
Start: 2024-12-19

## 2024-12-19 RX ORDER — ESCITALOPRAM OXALATE 5 MG/1
15 TABLET ORAL
Qty: 30 TABLET | Refills: 3 | Status: SHIPPED | OUTPATIENT
Start: 2024-12-19

## 2024-12-19 RX ADMIN — ACETAMINOPHEN 1000 MG: 500 TABLET ORAL at 14:04

## 2024-12-19 RX ADMIN — FERROUS SULFATE TAB 325 MG (65 MG ELEMENTAL FE) 325 MG: 325 (65 FE) TAB at 09:57

## 2024-12-19 RX ADMIN — IBUPROFEN 800 MG: 400 TABLET, FILM COATED ORAL at 06:23

## 2024-12-19 RX ADMIN — Medication 1 TABLET: at 09:57

## 2024-12-19 RX ADMIN — DOCUSATE SODIUM 100 MG: 100 CAPSULE, LIQUID FILLED ORAL at 09:57

## 2024-12-19 RX ADMIN — ACETAMINOPHEN 1000 MG: 500 TABLET ORAL at 06:21

## 2024-12-19 RX ADMIN — LABETALOL HYDROCHLORIDE 100 MG: 200 TABLET, FILM COATED ORAL at 09:56

## 2024-12-19 RX ADMIN — IBUPROFEN 800 MG: 400 TABLET, FILM COATED ORAL at 14:04

## 2024-12-19 ASSESSMENT — PAIN DESCRIPTION - ORIENTATION: ORIENTATION: LOWER

## 2024-12-19 ASSESSMENT — PAIN SCALES - GENERAL: PAINLEVEL_OUTOF10: 1

## 2024-12-19 ASSESSMENT — PAIN DESCRIPTION - DESCRIPTORS: DESCRIPTORS: ACHING

## 2024-12-19 ASSESSMENT — PAIN DESCRIPTION - LOCATION: LOCATION: INCISION

## 2024-12-19 ASSESSMENT — PAIN - FUNCTIONAL ASSESSMENT: PAIN_FUNCTIONAL_ASSESSMENT: ACTIVITIES ARE NOT PREVENTED

## 2024-12-19 NOTE — LACTATION NOTE
Mother continues to pump for baby in NICU.  She has a pump for use at home and can use the hospital pump when visiting.  She is beginning to collect more colostrum with pumping sessions.  Mother has no further questions for lactation consultant before discharge.

## 2024-12-19 NOTE — PROGRESS NOTES
RN telephoned MD steele to make sure that patient was allowed to leave with bps 132/90. MD steele gave permission for her to leave.

## 2024-12-19 NOTE — PROGRESS NOTES
Post-Operative  Day 4  Patient doing well without unusual complaints. Passing flatus, voiding and ambulating without difficulty. Tolerating regular diet. Has had BM. Baby in NICU and currently being rewarmed from cooling. They plan for MRI later today.    Denies HA, visual changes, SOB, or mid epigastric pain    Vitals:  /88   Pulse 75   Temp 98.4 °F (36.9 °C) (Oral)   Resp 16   SpO2 96%   Breastfeeding Unknown   Temp (24hrs), Av.2 °F (36.8 °C), Min:97.8 °F (36.6 °C), Max:98.4 °F (36.9 °C)        Exam:      Patient without distress.               Abdomen: soft, expected tenderness, fundus firm                Wound incision clean, dry and intact. Dermabond in place.                Lower extremities are nontender without edema.  No cords    Labs:   Lab Results   Component Value Date/Time    WBC 16.1 2024 04:35 AM    WBC 7.7 2024 12:02 PM    WBC 10.3 2024 12:40 PM    WBC 9.3 2024 04:45 PM    HGB 8.6 2024 04:35 AM    HGB 10.6 2024 12:02 PM    HGB 10.7 2024 12:40 PM    HGB 10.2 2024 04:45 PM    HCT 26.0 2024 04:35 AM    HCT 31.9 2024 12:02 PM    HCT 31.7 2024 12:40 PM    HCT 30.0 2024 04:45 PM     2024 04:35 AM     2024 12:02 PM     2024 12:40 PM     2024 04:45 PM       No results found for this or any previous visit (from the past 24 hour(s)).        Assessment/Plan: Ms. Sudha Barriga is a 39 y.o.  who is Post-Op day 4 s/p emergent LTCS 2/2 cat 3 tracing while pushing at 38w1d following IOL 2/2 CHTN & IVF pregnancy. Delivery notable for suspected placental abruption and true knot. Baby boy in NICU on cooling     Postoperative care:   - continue routine post op care  - Baby boy, in NICU  - Rh pos/ rubella immune     Acute Blood Loss Anemia (BMF619)  - Hgb 8.6 > asymptomatic   - PO iron      CHTN  - Currently on labetalol 100mg BID and Nifedipine 30XL

## 2024-12-19 NOTE — LACTATION NOTE
This note was copied from a baby's chart.  Mother continues to pump every 3 hours. Mother states that she got out 3 ml at the last pump. Showed mother breast compressions so she can add that to her pumping routine.

## 2024-12-19 NOTE — DISCHARGE SUMMARY
Patient ID:  Sudha Barriga  [id number]  39 y.o.  1985    Admit Date: 2024    Discharge Date: 2024     Admitting Physician: Sruthi Boone MD  Attending Physician: Sarita Ivey DO    Admission Diagnoses: Encounter for induction of labor [Z34.90]  Chronic hypertension     Discharge Diagnoses: Same as above with emergent PLTCS producing a viable infant.  Information for the patient's :  VIOLET Barriga [[id number]]   Birthweight: 3.259 kg (7 lb 3 oz)       Additional Diagnoses: advanced maternal age and acute blood loss anemia .     Maternal Labs: O+/ Rubella immune  Hgb 8.6    Cord Blood Results:   Information for the patient's :  VIOLET Barriga [[id number]]     Lab Results   Component Value Date/Time    ABORH A POSITIVE 12/15/2024 05:14 PM    BILI IF DIRECT DRE POSITIVE, BILIRUBIN TO FOLLOW 12/15/2024 04:05 PM           History of Present Illness:   OB History          1    Para   1    Term   1            AB        Living   1         SAB        IAB        Ectopic        Molar        Multiple   0    Live Births   1              Admitted for on 24 at 37w6d for IOL due to CHTN on two medications, IVF, and AMA    Hospital Course:   Patient was admitted as above and delivered via emergent PLTCS on 12/15/24 due to cat 3 tracing. Delivery notable for nuchal cord, true umbilical cord knot, and suspected placental abruption. Baby required NICU admission and cooling. . The postpartum course was unremarkable and patients home BP initially decrased due to hypotension, likely 2/2 blood loss. However, BP meds were increased back to pre delivery regimen of Nifedipne 30mgXL daily  and labetalol 100mg BID. She was deemed stable for discharge home on POD 4.     Follow-up:  She was instructed to follow-up with her obstetrician in 2 & 6 weeks    Results of Postpartum Depression Screening:  EPDS   pending     Signed:  Sarita Ivey DO  2024  8:12 AM

## (undated) DEVICE — PENCIL ES L3M ROCK SWCH S STL HEX LOK BLDE ELECTRD HOLSTER

## (undated) DEVICE — ELECTRODE PT RET AD L9FT HI MOIST COND ADH HYDRGEL CORDED

## (undated) DEVICE — CLEANER ES TIP W2XL2IN ADH BK RADPQ FOR S STL ELECTRD

## (undated) DEVICE — STERILE POLYISOPRENE POWDER-FREE SURGICAL GLOVES: Brand: PROTEXIS

## (undated) DEVICE — SUTURE PDS II SZ 0 L36IN ABSRB VLT L40MM CT 1/2 CIR Z358T

## (undated) DEVICE — SOLUTION IV 1000ML 0.9% SOD CHL

## (undated) DEVICE — Z INACTIVE NO ACTIVE SUPPLIER APPLICATOR MEDICATED 26 CC TINT HI-LITE ORNG STRL CHLORAPREP

## (undated) DEVICE — Z DUP USE 2271313 SOLIDIFIER FLUID 3000 CC ABSORB

## (undated) DEVICE — SUTURE MONOCRYL SZ 4-0 L27IN ABSRB UD L19MM PS-2 1/2 CIR PRIM Y426H

## (undated) DEVICE — KENDALL SCD EXPRESS SLEEVES, KNEE LENGTH, MEDIUM: Brand: KENDALL SCD

## (undated) DEVICE — SUTURE MONOCRYL SZ 2-0 L36IN ABSRB UD L36MM CT-1 1/2 CIR Y945H

## (undated) DEVICE — STERILE POLYISOPRENE POWDER-FREE SURGICAL GLOVES WITH EMOLLIENT COATING: Brand: PROTEXIS

## (undated) DEVICE — C-SECTION II-LF: Brand: MEDLINE INDUSTRIES, INC.

## (undated) DEVICE — APPLICATOR BNDG 1MM ADH PREMIERPRO EXOFIN

## (undated) DEVICE — 3000CC GUARDIAN II: Brand: GUARDIAN

## (undated) DEVICE — SUTURE PDS II SZ 0 L60IN ABSRB VLT L48MM CTX 1/2 CIR Z990G